# Patient Record
Sex: FEMALE | Race: WHITE | NOT HISPANIC OR LATINO | URBAN - METROPOLITAN AREA
[De-identification: names, ages, dates, MRNs, and addresses within clinical notes are randomized per-mention and may not be internally consistent; named-entity substitution may affect disease eponyms.]

---

## 2020-09-10 ENCOUNTER — INPATIENT (INPATIENT)
Facility: HOSPITAL | Age: 70
LOS: 2 days | Discharge: HOME | DRG: 309 | End: 2020-09-13
Attending: GENERAL PRACTICE | Admitting: GENERAL PRACTICE
Payer: MEDICARE

## 2020-09-10 VITALS
DIASTOLIC BLOOD PRESSURE: 76 MMHG | OXYGEN SATURATION: 95 % | HEART RATE: 103 BPM | HEIGHT: 63 IN | WEIGHT: 177.91 LBS | SYSTOLIC BLOOD PRESSURE: 131 MMHG | RESPIRATION RATE: 22 BRPM | TEMPERATURE: 98 F

## 2020-09-10 DIAGNOSIS — I48.91 UNSPECIFIED ATRIAL FIBRILLATION: ICD-10-CM

## 2020-09-10 DIAGNOSIS — F41.9 ANXIETY DISORDER, UNSPECIFIED: ICD-10-CM

## 2020-09-10 DIAGNOSIS — I34.0 NONRHEUMATIC MITRAL (VALVE) INSUFFICIENCY: ICD-10-CM

## 2020-09-10 DIAGNOSIS — I10 ESSENTIAL (PRIMARY) HYPERTENSION: ICD-10-CM

## 2020-09-10 DIAGNOSIS — I48.19 OTHER PERSISTENT ATRIAL FIBRILLATION: ICD-10-CM

## 2020-09-10 DIAGNOSIS — E03.9 HYPOTHYROIDISM, UNSPECIFIED: ICD-10-CM

## 2020-09-10 DIAGNOSIS — Z98.89 OTHER SPECIFIED POSTPROCEDURAL STATES: Chronic | ICD-10-CM

## 2020-09-10 LAB
ALBUMIN SERPL ELPH-MCNC: 4.7 G/DL — SIGNIFICANT CHANGE UP (ref 3.3–5)
ALP SERPL-CCNC: 63 U/L — SIGNIFICANT CHANGE UP (ref 40–120)
ALT FLD-CCNC: 13 U/L — SIGNIFICANT CHANGE UP (ref 10–45)
ANION GAP SERPL CALC-SCNC: 16 MMOL/L — SIGNIFICANT CHANGE UP (ref 5–17)
APTT BLD: 38.8 SEC — HIGH (ref 27.5–35.5)
AST SERPL-CCNC: 16 U/L — SIGNIFICANT CHANGE UP (ref 10–40)
BASOPHILS # BLD AUTO: 0.04 K/UL — SIGNIFICANT CHANGE UP (ref 0–0.2)
BASOPHILS NFR BLD AUTO: 0.4 % — SIGNIFICANT CHANGE UP (ref 0–2)
BILIRUB SERPL-MCNC: 0.5 MG/DL — SIGNIFICANT CHANGE UP (ref 0.2–1.2)
BUN SERPL-MCNC: 13 MG/DL — SIGNIFICANT CHANGE UP (ref 7–23)
CALCIUM SERPL-MCNC: 9.4 MG/DL — SIGNIFICANT CHANGE UP (ref 8.4–10.5)
CHLORIDE SERPL-SCNC: 97 MMOL/L — SIGNIFICANT CHANGE UP (ref 96–108)
CO2 SERPL-SCNC: 22 MMOL/L — SIGNIFICANT CHANGE UP (ref 22–31)
CREAT SERPL-MCNC: 0.6 MG/DL — SIGNIFICANT CHANGE UP (ref 0.5–1.3)
EOSINOPHIL # BLD AUTO: 0.07 K/UL — SIGNIFICANT CHANGE UP (ref 0–0.5)
EOSINOPHIL NFR BLD AUTO: 0.6 % — SIGNIFICANT CHANGE UP (ref 0–6)
GLUCOSE SERPL-MCNC: 107 MG/DL — HIGH (ref 70–99)
HCT VFR BLD CALC: 43.7 % — SIGNIFICANT CHANGE UP (ref 34.5–45)
HGB BLD-MCNC: 14.4 G/DL — SIGNIFICANT CHANGE UP (ref 11.5–15.5)
IMM GRANULOCYTES NFR BLD AUTO: 0.4 % — SIGNIFICANT CHANGE UP (ref 0–1.5)
INR BLD: 1.41 RATIO — HIGH (ref 0.88–1.16)
LYMPHOCYTES # BLD AUTO: 1.97 K/UL — SIGNIFICANT CHANGE UP (ref 1–3.3)
LYMPHOCYTES # BLD AUTO: 17.8 % — SIGNIFICANT CHANGE UP (ref 13–44)
MCHC RBC-ENTMCNC: 29.7 PG — SIGNIFICANT CHANGE UP (ref 27–34)
MCHC RBC-ENTMCNC: 33 GM/DL — SIGNIFICANT CHANGE UP (ref 32–36)
MCV RBC AUTO: 90.1 FL — SIGNIFICANT CHANGE UP (ref 80–100)
MONOCYTES # BLD AUTO: 0.95 K/UL — HIGH (ref 0–0.9)
MONOCYTES NFR BLD AUTO: 8.6 % — SIGNIFICANT CHANGE UP (ref 2–14)
NEUTROPHILS # BLD AUTO: 7.98 K/UL — HIGH (ref 1.8–7.4)
NEUTROPHILS NFR BLD AUTO: 72.2 % — SIGNIFICANT CHANGE UP (ref 43–77)
NRBC # BLD: 0 /100 WBCS — SIGNIFICANT CHANGE UP (ref 0–0)
PLATELET # BLD AUTO: 284 K/UL — SIGNIFICANT CHANGE UP (ref 150–400)
POTASSIUM SERPL-MCNC: 3.9 MMOL/L — SIGNIFICANT CHANGE UP (ref 3.5–5.3)
POTASSIUM SERPL-SCNC: 3.9 MMOL/L — SIGNIFICANT CHANGE UP (ref 3.5–5.3)
PROT SERPL-MCNC: 7.7 G/DL — SIGNIFICANT CHANGE UP (ref 6–8.3)
PROTHROM AB SERPL-ACNC: 16.5 SEC — HIGH (ref 10.6–13.6)
RBC # BLD: 4.85 M/UL — SIGNIFICANT CHANGE UP (ref 3.8–5.2)
RBC # FLD: 13.9 % — SIGNIFICANT CHANGE UP (ref 10.3–14.5)
SARS-COV-2 RNA SPEC QL NAA+PROBE: SIGNIFICANT CHANGE UP
SODIUM SERPL-SCNC: 135 MMOL/L — SIGNIFICANT CHANGE UP (ref 135–145)
TROPONIN T, HIGH SENSITIVITY RESULT: 13 NG/L — SIGNIFICANT CHANGE UP (ref 0–51)
WBC # BLD: 11.05 K/UL — HIGH (ref 3.8–10.5)
WBC # FLD AUTO: 11.05 K/UL — HIGH (ref 3.8–10.5)

## 2020-09-10 PROCEDURE — 71046 X-RAY EXAM CHEST 2 VIEWS: CPT | Mod: 26

## 2020-09-10 PROCEDURE — 99285 EMERGENCY DEPT VISIT HI MDM: CPT

## 2020-09-10 PROCEDURE — 93010 ELECTROCARDIOGRAM REPORT: CPT

## 2020-09-10 RX ORDER — LEVOTHYROXINE SODIUM 125 MCG
75 TABLET ORAL DAILY
Refills: 0 | Status: DISCONTINUED | OUTPATIENT
Start: 2020-09-10 | End: 2020-09-13

## 2020-09-10 RX ORDER — FUROSEMIDE 40 MG
20 TABLET ORAL DAILY
Refills: 0 | Status: DISCONTINUED | OUTPATIENT
Start: 2020-09-10 | End: 2020-09-13

## 2020-09-10 RX ORDER — BUDESONIDE AND FORMOTEROL FUMARATE DIHYDRATE 160; 4.5 UG/1; UG/1
2 AEROSOL RESPIRATORY (INHALATION)
Refills: 0 | Status: DISCONTINUED | OUTPATIENT
Start: 2020-09-10 | End: 2020-09-11

## 2020-09-10 RX ORDER — METOPROLOL TARTRATE 50 MG
50 TABLET ORAL
Refills: 0 | Status: DISCONTINUED | OUTPATIENT
Start: 2020-09-10 | End: 2020-09-11

## 2020-09-10 RX ORDER — ALBUTEROL 90 UG/1
2 AEROSOL, METERED ORAL EVERY 6 HOURS
Refills: 0 | Status: DISCONTINUED | OUTPATIENT
Start: 2020-09-10 | End: 2020-09-13

## 2020-09-10 RX ORDER — ALPRAZOLAM 0.25 MG
0.12 TABLET ORAL
Refills: 0 | Status: DISCONTINUED | OUTPATIENT
Start: 2020-09-10 | End: 2020-09-13

## 2020-09-10 RX ORDER — SIMVASTATIN 20 MG/1
20 TABLET, FILM COATED ORAL AT BEDTIME
Refills: 0 | Status: DISCONTINUED | OUTPATIENT
Start: 2020-09-10 | End: 2020-09-13

## 2020-09-10 RX ORDER — APIXABAN 2.5 MG/1
5 TABLET, FILM COATED ORAL EVERY 12 HOURS
Refills: 0 | Status: DISCONTINUED | OUTPATIENT
Start: 2020-09-10 | End: 2020-09-13

## 2020-09-10 RX ORDER — CHOLECALCIFEROL (VITAMIN D3) 125 MCG
2000 CAPSULE ORAL DAILY
Refills: 0 | Status: DISCONTINUED | OUTPATIENT
Start: 2020-09-10 | End: 2020-09-13

## 2020-09-10 RX ORDER — INFLUENZA VIRUS VACCINE 15; 15; 15; 15 UG/.5ML; UG/.5ML; UG/.5ML; UG/.5ML
0.5 SUSPENSION INTRAMUSCULAR ONCE
Refills: 0 | Status: DISCONTINUED | OUTPATIENT
Start: 2020-09-10 | End: 2020-09-13

## 2020-09-10 RX ORDER — MONTELUKAST 4 MG/1
10 TABLET, CHEWABLE ORAL AT BEDTIME
Refills: 0 | Status: DISCONTINUED | OUTPATIENT
Start: 2020-09-10 | End: 2020-09-13

## 2020-09-10 RX ORDER — LOSARTAN POTASSIUM 100 MG/1
25 TABLET, FILM COATED ORAL DAILY
Refills: 0 | Status: DISCONTINUED | OUTPATIENT
Start: 2020-09-10 | End: 2020-09-13

## 2020-09-10 RX ORDER — TIOTROPIUM BROMIDE 18 UG/1
1 CAPSULE ORAL; RESPIRATORY (INHALATION) DAILY
Refills: 0 | Status: DISCONTINUED | OUTPATIENT
Start: 2020-09-10 | End: 2020-09-13

## 2020-09-10 RX ADMIN — Medication 50 MILLIGRAM(S): at 23:50

## 2020-09-10 RX ADMIN — APIXABAN 5 MILLIGRAM(S): 2.5 TABLET, FILM COATED ORAL at 21:01

## 2020-09-10 RX ADMIN — SIMVASTATIN 20 MILLIGRAM(S): 20 TABLET, FILM COATED ORAL at 21:01

## 2020-09-10 NOTE — ED PROVIDER NOTE - CLINICAL SUMMARY MEDICAL DECISION MAKING FREE TEXT BOX
Attending MD Cook: 70F with ho afib on eliquis, MVR referred to ED for CORMIER and ongoing afib. Patient in afib currently though rate controlled, no diagnostic ischemic changes on ECG. Patient to be admitted for consideration of DCCV given likely symptomatic afib. Case discussed with cardiology Dr. Espinoza who requests admission to Dr. Messina for tele and consideration of DCCV

## 2020-09-10 NOTE — H&P ADULT - PROBLEM SELECTOR PLAN 1
appreciated cardiology note and discussed  plan for BIPIN + DCC tomorrow  NPO post midnight  continue beta blocker  continue AC  tele monitoring

## 2020-09-10 NOTE — ED PROVIDER NOTE - ATTENDING CONTRIBUTION TO CARE
Attending MD Cook:  I personally have seen and examined this patient.  Resident note reviewed and agree on plan of care and except where noted.  See HPI, PE, and MDM for details.

## 2020-09-10 NOTE — PATIENT PROFILE ADULT - STATED REASON FOR ADMISSION
"I went to my cardiologist after feeling SOB and found out I had afib. I was told by my PCP to come in."

## 2020-09-10 NOTE — ED PROVIDER NOTE - FAMILY HISTORY
Mother  Still living? Unknown  Family history of CHF (congestive heart failure), Age at diagnosis: Age Unknown  Family history of lung cancer, Age at diagnosis: Age Unknown

## 2020-09-10 NOTE — ED PROVIDER NOTE - PMH
Cardiac arrhythmia    HTN (hypertension)    Hypothyroid    Mitral regurgitation    Mitral valve prolapse    Osteoporosis    Syncope    Varicose vein

## 2020-09-10 NOTE — ED ADULT NURSE NOTE - OBJECTIVE STATEMENT
70 year old female presents to ED via wheel chair through waiting room from PMD complaining of shortness of breath / CORMIER. History of hypothyroid, COPD, asthma. States she had not been feeling well x 1+ month, went to PMD a month ago and was diagnosed with new onset AFib, started on metoprolol and Eliquis. States she went for follow up today and was sent in for continued sob/cormier "and he wants me to get a BIPIN". States shortness of breath is resolved at rest. Denies headache, chest pain, abdominal pain, palpitations, NVD, fevers, chills.

## 2020-09-10 NOTE — CONSULT NOTE ADULT - SUBJECTIVE AND OBJECTIVE BOX
CHIEF COMPLAINT:Patient is a 70y old  Female who presents with a chief complaint of sob and palpitation      HPI:  pt with hx of vhtn , asthma, s/p MV repair few years ago who was seen in the office with increasing sob and palpitation and was found to be in rapid a.fib, pt lopressor has increased and started on ac.  pt presented to er with increasing sob and palpitation.    PAST MEDICAL & SURGICAL HISTORY:  Osteoporosis  Hypothyroid  Syncope  Cardiac arrhythmia  Varicose vein  HTN (hypertension)  Mitral regurgitation  Mitral valve prolapse  S/P mitral valve repair  History of varicose vein stripping   delivery NOS      MEDICATIONS  (STANDING):    MEDICATIONS  (PRN):      FAMILY HISTORY:  Family history of lung cancer  Family history of CHF (congestive heart failure)      SOCIAL HISTORY:    [ ] Non-smoker  [ ] Smoker  [ ] Alcohol    Allergies    No Known Allergies    Intolerances    	    REVIEW OF SYSTEMS:  CONSTITUTIONAL: No fever, weight loss, or fatigue  EYES: No eye pain, visual disturbances, or discharge  ENT:  No difficulty hearing, tinnitus, vertigo; No sinus or throat pain  NECK: No pain or stiffness  RESPIRATORY: No cough, wheezing, chills or hemoptysis; + Shortness of Breath  CARDIOVASCULAR: No chest pain,+  palpitations, no passing out, dizziness, or leg swelling  GASTROINTESTINAL: No abdominal or epigastric pain. No nausea, vomiting, or hematemesis; No diarrhea or constipation. No melena or hematochezia.  GENITOURINARY: No dysuria, frequency, hematuria, or incontinence  NEUROLOGICAL: No headaches, memory loss, loss of strength, numbness, or tremors  SKIN: No itching, burning, rashes, or lesions   LYMPH Nodes: No enlarged glands  ENDOCRINE: No heat or cold intolerance; No hair loss  MUSCULOSKELETAL: No joint pain or swelling; No muscle, back, or extremity pain  PSYCHIATRIC: No depression, anxiety, mood swings, or difficulty sleeping  HEME/LYMPH: No easy bruising, or bleeding gums  ALLERGY AND IMMUNOLOGIC: No hives or eczema	    [ ] All others negative	  [ ] Unable to obtain    PHYSICAL EXAM:  T(C): 36.7 (09-10-20 @ 13:41), Max: 36.7 (09-10-20 @ 13:41)  HR: 103 (09-10-20 @ 13:41) (103 - 103)  BP: 131/76 (09-10-20 @ 13:41) (131/76 - 131/76)  RR: 22 (09-10-20 @ 13:41) (22 - 22)  SpO2: 95% (09-10-20 @ 13:41) (95% - 95%)  Wt(kg): --  I&O's Summary      Appearance: Normal	  HEENT:   Normal oral mucosa, PERRL, EOMI, +JVP	  Lymphatic: No lymphadenopathy  Cardiovascular: Normal S1 S2, No JVD, + murmurs, No edema  Respiratory: rhonchi  Psychiatry: A & O x 3, Mood & affect appropriate  Gastrointestinal:  Soft, Non-tender, + BS	  Skin: No rashes, No ecchymoses, No cyanosis	  Neurologic: Non-focal  Extremities: Normal range of motion, No clubbing, cyanosis or edema  Vascular: Peripheral pulses palpable 2+ bilaterally    TELEMETRY: 	    ECG:  	  RADIOLOGY:  OTHER: 	  	  LABS:	 	    CARDIAC MARKERS:                              14.4   11.05 )-----------( 284      ( 10 Sep 2020 14:33 )             43.7     09-10    135  |  97  |  13  ----------------------------<  107<H>  3.9   |  22  |  0.60    Ca    9.4      10 Sep 2020 14:33    TPro  7.7  /  Alb  4.7  /  TBili  0.5  /  DBili  x   /  AST  16  /  ALT  13  /  AlkPhos  63  09-10    proBNP:   Lipid Profile:   HgA1c:   TSH:   PT/INR - ( 10 Sep 2020 14:33 )   PT: 16.5 sec;   INR: 1.41 ratio         PTT - ( 10 Sep 2020 14:33 )  PTT:38.8 sec    PREVIOUS DIAGNOSTIC TESTING:    < from: Transthoracic Echocardiogram (14 @ 07:50) >  1. An annuloplasty ring seen in mitral position.Mean  transmitral valve gradient equals 3 mm Hg, estimatedmitral  valve area equals 2.5 sqcm (by pressure half time  equation). Minimal mitral regurgitation.  2. Normal trileaflet aortic valve.  3. Normal aortic root.  4. Mild left atrial enlargement.  5. Mild concentric left ventricular hypertrophy.  6. Normal left ventricular function.  7. Grade I diastolic dysfunction    < end of copied text >            PAST MEDICAL & SURGICAL HISTORY:  Osteoporosis  Hypothyroid  Syncope  Cardiac arrhythmia  Varicose vein  HTN (hypertension)  Mitral regurgitation  Mitral valve prolapse  S/P mitral valve repair  History of varicose vein stripping   delivery NOS      MEDICATIONS  (STANDING):    MEDICATIONS  (PRN):      FAMILY HISTORY:  Family history of lung cancer  Family history of CHF (congestive heart failure)      SOCIAL HISTORY:    [ ] Non-smoker  [ ] Smoker  [ ] Alcohol    Allergies    No Known Allergies    Intolerances    	    REVIEW OF SYSTEMS:  CONSTITUTIONAL: No fever, weight loss, or fatigue  EYES: No eye pain, visual disturbances, or discharge  ENT:  No difficulty hearing, tinnitus, vertigo; No sinus or throat pain  NECK: No pain or stiffness  RESPIRATORY: No cough, wheezing, chills or hemoptysis; No Shortness of Breath  CARDIOVASCULAR: No chest pain, palpitations, passing out, dizziness, or leg swelling  GASTROINTESTINAL: No abdominal or epigastric pain. No nausea, vomiting, or hematemesis; No diarrhea or constipation. No melena or hematochezia.  GENITOURINARY: No dysuria, frequency, hematuria, or incontinence  NEUROLOGICAL: No headaches, memory loss, loss of strength, numbness, or tremors  SKIN: No itching, burning, rashes, or lesions   LYMPH Nodes: No enlarged glands  ENDOCRINE: No heat or cold intolerance; No hair loss  MUSCULOSKELETAL: No joint pain or swelling; No muscle, back, or extremity pain  PSYCHIATRIC: No depression, anxiety, mood swings, or difficulty sleeping  HEME/LYMPH: No easy bruising, or bleeding gums  ALLERGY AND IMMUNOLOGIC: No hives or eczema	    [ ] All others negative	  [ ] Unable to obtain    PHYSICAL EXAM:  T(C): 36.7 (09-10-20 @ 13:41), Max: 36.7 (09-10-20 @ 13:41)  HR: 103 (09-10-20 @ 13:41) (103 - 103)  BP: 131/76 (09-10-20 @ 13:41) (131/76 - 131/76)  RR: 22 (09-10-20 @ 13:41) (22 - 22)  SpO2: 95% (09-10-20 @ 13:41) (95% - 95%)  Wt(kg): --  I&O's Summary      Appearance: Normal	  HEENT:   Normal oral mucosa, PERRL, EOMI	  Lymphatic: No lymphadenopathy  Cardiovascular: Normal S1 S2, No JVD,+ murmurs, No edema  Respiratory: Lungs clear to auscultation	  Psychiatry: A & O x 3, Mood & affect appropriate  Gastrointestinal:  Soft, Non-tender, + BS	  Skin: No rashes, No ecchymoses, No cyanosis	  Neurologic: Non-focal  Extremities: Normal range of motion, No clubbing, cyanosis or edema  Vascular: Peripheral pulses palpable 2+ bilaterally    TELEMETRY: 	    ECG:  	  RADIOLOGY:  OTHER: 	  	  LABS:	 	    CARDIAC MARKERS:                              14.4   11.05 )-----------( 284      ( 10 Sep 2020 14:33 )             43.7     09-10    135  |  97  |  13  ----------------------------<  107<H>  3.9   |  22  |  0.60    Ca    9.4      10 Sep 2020 14:33    TPro  7.7  /  Alb  4.7  /  TBili  0.5  /  DBili  x   /  AST  16  /  ALT  13  /  AlkPhos  63  09-10    proBNP:   Lipid Profile:   HgA1c:   TSH:   PT/INR - ( 10 Sep 2020 14:33 )   PT: 16.5 sec;   INR: 1.41 ratio         PTT - ( 10 Sep 2020 14:33 )  PTT:38.8 sec    PREVIOUS DIAGNOSTIC TESTING:

## 2020-09-10 NOTE — ED PROVIDER NOTE - PROGRESS NOTE DETAILS
Ceasr Samson MD:  Discussed case with Dr. Espinoza, states case was discussed with Dr. Lombardi and does not require contact prior to admission.

## 2020-09-10 NOTE — ED PROVIDER NOTE - OBJECTIVE STATEMENT
70 Y F H/O Afib (1month) on eliquis, rate controlled (metoprolol 50 mg am, 25 mg pm), CHF, presenting with 70 Y F H/O Afib (diagnosed 1 month ago) on eliquis, rate controlled (metoprolol 50 mg am, 25 mg pm), CHF, presenting due to request from primary care physician Dr. Espinoza for admission. SOB has been exertional, no improvement with attempts at rate control. Denies any Fever, chills, CP, Dyspnea, Nausea, Vomiting, Dysuria, AMS, vision change.

## 2020-09-10 NOTE — ED PROVIDER NOTE - PHYSICAL EXAMINATION
Physical Exam:  General: NAD, Conversive  Eyes: EOMI, Conjunctia and sclera clear  Neck: No JVD  Lungs: Clear to auscultation bilaterally, no wheeze, no rhonchi  Heart: Irregular HR  Abdomen: Soft, nontender, nondistended, no CVA tenderness  Extremities: 2+ peripheral pulses, no edema  Psych: AAO X3  Neurologic: Non-focal

## 2020-09-10 NOTE — H&P ADULT - PROBLEM SELECTOR PLAN 4
post mitral valve procedure  check BIPIN as above      concern for malfunction given recurrence of Afib  cardio appreciated

## 2020-09-10 NOTE — ED ADULT NURSE REASSESSMENT NOTE - NS ED NURSE REASSESS COMMENT FT1
Received report this PM from Dory Cox  RN. PT observed resting comfortably in bed. Pt denies any needs at this time. Awaiting admission bed . Plan of care reviewed with pt. Pt educated on call bell system and provided call bell. Bed in lowest position, wheels locked, and patient placed in position of comfort.

## 2020-09-10 NOTE — CONSULT NOTE ADULT - ASSESSMENT
pt with hx of vhtn , asthma, s/p MV repair few years ago who was seen in the office with increasing sob and palpitation and was found to be in rapid a.fib, pt lopressor has increased and started on ac.  pt presented to er with increasing sob and palpitation.  new onset of chf with new a.fib  r/o valvular dysfunction will schedule   for possible BIPIN/CARDIOVERSION in am  continue ac  increase beta blocker as tolerated  tele  chest x ray  will adjust cardiac meds

## 2020-09-10 NOTE — ED PROVIDER NOTE - NS ED ROS FT
GENERAL: No fever or chills  EYES: No change in vision  HEENT: No trouble swallowing or speaking  CARDIAC: No chest pain  PULMONARY: No cough + SOB  GI: No abdominal pain, no nausea or no vomiting, no diarrhea or constipation  : No changes in urination  SKIN: No rashes  NEURO: No headache, no numbness  MSK: No joint pain  Otherwise as HPI or negative.

## 2020-09-10 NOTE — H&P ADULT - HISTORY OF PRESENT ILLNESS
>>>>>>Medical Attending Initial / Admission note<<<<<<  -------------------------------------------------------------------------------  CHIEF COMPLAINT & HISTORY OF PRESENT ILLNESS:      Pt is a 69 y/o woman with PMH of Afib (diagnosed 1 month ago) on eliquis, rate controlled, Hypothyroidism, anxiety, HTN, post mitral valve surgery, presenting from cardiology office for admission for worsening CORMIER, likely due to Afib > for BIPIN + DCC.   Pt states about a month ago pt was noted to be more dyspneic and found to be in Afib on EKG ( only had brief Afib post op) > started on meds, including Eliquis   SOB has been exertional, no improvement with attempts at rate control.   Denies any Fever, chills, CP, Dyspnea, Nausea, Vomiting, Dysuria, AMS, vision change.  pt otherwise feels Ok, comfortable    --------------------------------------------------------------------------------  PAST MEDICAL HISTORY:    HTN (hypertension)    Hypothyroid    Mitral regurgitation    Mitral valve prolapse    Osteoporosis    Syncope    Varicose vein.  Afib    --------------------------------------------------------------------------------  PAST SURGICAL HISTORY:     delivery NOS    History of varicose vein stripping    S/P mitral valve repair.    --------------------------------------------------------------------------------  FAMILY HISTORY:    Family history of CHF   Family history of lung cancer    --------------------------------------------------------------------------------  SOCIAL HISTORY:  Alcohol: None reported  Smoking: None reported     --------------------------------------------------------------------------------  ALLERGIES:    [As bellow, reviewed]    --------------------------------------------------------------------------------  MEDICATIONS:   [As marlen, reviewed]    --------------------------------------------------------------------------------  REVIEW OF SYSTEM:    GEN: no fever, no chills, no pain  RESP: no SOB at rest , no cough, no sputum  CVS: no chest pain, no palpitations, no edema  GI: no abdominal pain, no nausea, no vomiting, no constipation, no diarrhea  : no dysuria, no frequency, no hematuria  Neuro: no headache, no dizziness  PSYCH: + anxiety at times , no depression  Derm : no itching, no rash    --------------------------------------------------------------------------------  VITAL SIGNS:     Height (cm): 160.02  Weight (kg): 80.7  BMI (kg/m2): 31.5  Vital Signs Last 24 HrsT(C): 36.7 (09-10-20 @ 13:41)  T(F): 98 (09-10-20 @ 13:41), Max: 98 (09-10-20 @ 13:41)  HR: 103 (09-10-20 @ :) (103 - 103)  BP: 131/76 (09-10-20 @ 13:41)  RR: 22 (09-10-20 @ 13:) (22 - 22)  SpO2: 95% (09-10-20 @ 13:) (95% - 95%)      --------------------------------------------------------------------------------  PHYSICAL EXAM:    GEN: A&O X 3 , NAD , comfortable, pleasant in chair   HEENT: NCAT, PERRL, MMM, hearing intact  Neck: supple , no JVD  CVS: S1S2 , Irregular , No M/R/G appreciated  PULM: CTA B/L,  no W/R/R appreciated  ABD.: soft. non tender, non distended,  bowel sounds present  Extrem: intact pulses , no edema   Derm: No rash , no ecchymoses  PSYCH : normal mood,  no delusion not anxious    --------------------------------------------------------------------------------  LAB AND IMAGING:   [As marlen, reviewed]    --------------------------------------------------------------------------------  ASSESSMENT AND PLAN:   [As marlen]    --------------------  Case discussed with pt, cardio   ___________________________  H. RICK Messina.  Pager: [phone]

## 2020-09-10 NOTE — ED ADULT TRIAGE NOTE - BP NONINVASIVE DIASTOLIC (MM HG)
detailed exam detailed exam detailed exam detailed exam detailed exam detailed exam detailed exam detailed exam detailed exam detailed exam detailed exam detailed exam detailed exam detailed exam detailed exam detailed exam detailed exam detailed exam Breath Sounds equal & clear to percussion & auscultation, no accessory muscle use Breath Sounds equal & clear to percussion & auscultation, no accessory muscle use Breath Sounds equal & clear to percussion & auscultation, no accessory muscle use detailed exam detailed exam Breath Sounds equal & clear to percussion & auscultation, no accessory muscle use Breath Sounds equal & clear to percussion & auscultation, no accessory muscle use Breath Sounds equal & clear to percussion & auscultation, no accessory muscle use Breath Sounds equal & clear to percussion & auscultation, no accessory muscle use Breath Sounds equal & clear to percussion & auscultation, no accessory muscle use Breath Sounds equal & clear to percussion & auscultation, no accessory muscle use Breath Sounds equal & clear to percussion & auscultation, no accessory muscle use Breath Sounds equal & clear to percussion & auscultation, no accessory muscle use Breath Sounds equal & clear to percussion & auscultation, no accessory muscle use Breath Sounds equal & clear to percussion & auscultation, no accessory muscle use Breath Sounds equal & clear to percussion & auscultation, no accessory muscle use Breath Sounds equal & clear to percussion & auscultation, no accessory muscle use Breath Sounds equal & clear to percussion & auscultation, no accessory muscle use 76

## 2020-09-11 ENCOUNTER — TRANSCRIPTION ENCOUNTER (OUTPATIENT)
Age: 70
End: 2020-09-11

## 2020-09-11 LAB
ANION GAP SERPL CALC-SCNC: 11 MMOL/L — SIGNIFICANT CHANGE UP (ref 5–17)
BUN SERPL-MCNC: 18 MG/DL — SIGNIFICANT CHANGE UP (ref 7–23)
CALCIUM SERPL-MCNC: 8.8 MG/DL — SIGNIFICANT CHANGE UP (ref 8.4–10.5)
CHLORIDE SERPL-SCNC: 100 MMOL/L — SIGNIFICANT CHANGE UP (ref 96–108)
CO2 SERPL-SCNC: 24 MMOL/L — SIGNIFICANT CHANGE UP (ref 22–31)
CREAT SERPL-MCNC: 0.67 MG/DL — SIGNIFICANT CHANGE UP (ref 0.5–1.3)
GLUCOSE SERPL-MCNC: 94 MG/DL — SIGNIFICANT CHANGE UP (ref 70–99)
HCT VFR BLD CALC: 39.8 % — SIGNIFICANT CHANGE UP (ref 34.5–45)
HGB BLD-MCNC: 12.9 G/DL — SIGNIFICANT CHANGE UP (ref 11.5–15.5)
MAGNESIUM SERPL-MCNC: 2 MG/DL — SIGNIFICANT CHANGE UP (ref 1.6–2.6)
MCHC RBC-ENTMCNC: 29.3 PG — SIGNIFICANT CHANGE UP (ref 27–34)
MCHC RBC-ENTMCNC: 32.4 GM/DL — SIGNIFICANT CHANGE UP (ref 32–36)
MCV RBC AUTO: 90.2 FL — SIGNIFICANT CHANGE UP (ref 80–100)
NRBC # BLD: 0 /100 WBCS — SIGNIFICANT CHANGE UP (ref 0–0)
PHOSPHATE SERPL-MCNC: 3.3 MG/DL — SIGNIFICANT CHANGE UP (ref 2.5–4.5)
PLATELET # BLD AUTO: 234 K/UL — SIGNIFICANT CHANGE UP (ref 150–400)
POTASSIUM SERPL-MCNC: 3.6 MMOL/L — SIGNIFICANT CHANGE UP (ref 3.5–5.3)
POTASSIUM SERPL-SCNC: 3.6 MMOL/L — SIGNIFICANT CHANGE UP (ref 3.5–5.3)
RBC # BLD: 4.41 M/UL — SIGNIFICANT CHANGE UP (ref 3.8–5.2)
RBC # FLD: 14.1 % — SIGNIFICANT CHANGE UP (ref 10.3–14.5)
SODIUM SERPL-SCNC: 135 MMOL/L — SIGNIFICANT CHANGE UP (ref 135–145)
WBC # BLD: 10.18 K/UL — SIGNIFICANT CHANGE UP (ref 3.8–10.5)
WBC # FLD AUTO: 10.18 K/UL — SIGNIFICANT CHANGE UP (ref 3.8–10.5)

## 2020-09-11 PROCEDURE — 93010 ELECTROCARDIOGRAM REPORT: CPT

## 2020-09-11 PROCEDURE — 93312 ECHO TRANSESOPHAGEAL: CPT | Mod: 26

## 2020-09-11 PROCEDURE — 93010 ELECTROCARDIOGRAM REPORT: CPT | Mod: 77

## 2020-09-11 PROCEDURE — 93306 TTE W/DOPPLER COMPLETE: CPT | Mod: 26

## 2020-09-11 RX ORDER — METOPROLOL TARTRATE 50 MG
25 TABLET ORAL
Refills: 0 | Status: DISCONTINUED | OUTPATIENT
Start: 2020-09-11 | End: 2020-09-12

## 2020-09-11 RX ORDER — METOPROLOL TARTRATE 50 MG
1 TABLET ORAL
Qty: 0 | Refills: 0 | DISCHARGE

## 2020-09-11 RX ORDER — METOPROLOL TARTRATE 50 MG
1 TABLET ORAL
Qty: 60 | Refills: 0
Start: 2020-09-11 | End: 2020-10-10

## 2020-09-11 RX ORDER — POTASSIUM CHLORIDE 20 MEQ
40 PACKET (EA) ORAL ONCE
Refills: 0 | Status: COMPLETED | OUTPATIENT
Start: 2020-09-11 | End: 2020-09-11

## 2020-09-11 RX ORDER — FLUTICASONE FUROATE AND VILANTEROL TRIFENATATE 100; 25 UG/1; UG/1
1 POWDER RESPIRATORY (INHALATION) DAILY
Refills: 0 | Status: DISCONTINUED | OUTPATIENT
Start: 2020-09-11 | End: 2020-09-13

## 2020-09-11 RX ADMIN — Medication 40 MILLIEQUIVALENT(S): at 17:43

## 2020-09-11 RX ADMIN — Medication 20 MILLIGRAM(S): at 06:06

## 2020-09-11 RX ADMIN — LOSARTAN POTASSIUM 25 MILLIGRAM(S): 100 TABLET, FILM COATED ORAL at 06:05

## 2020-09-11 RX ADMIN — SIMVASTATIN 20 MILLIGRAM(S): 20 TABLET, FILM COATED ORAL at 20:04

## 2020-09-11 RX ADMIN — Medication 75 MICROGRAM(S): at 06:06

## 2020-09-11 RX ADMIN — Medication 50 MILLIGRAM(S): at 08:01

## 2020-09-11 RX ADMIN — APIXABAN 5 MILLIGRAM(S): 2.5 TABLET, FILM COATED ORAL at 08:02

## 2020-09-11 RX ADMIN — Medication 2000 UNIT(S): at 11:56

## 2020-09-11 RX ADMIN — APIXABAN 5 MILLIGRAM(S): 2.5 TABLET, FILM COATED ORAL at 17:14

## 2020-09-11 RX ADMIN — MONTELUKAST 10 MILLIGRAM(S): 4 TABLET, CHEWABLE ORAL at 20:04

## 2020-09-11 RX ADMIN — MONTELUKAST 10 MILLIGRAM(S): 4 TABLET, CHEWABLE ORAL at 00:43

## 2020-09-11 RX ADMIN — Medication 25 MILLIGRAM(S): at 17:15

## 2020-09-11 NOTE — PRE-ANESTHESIA EVALUATION ADULT - NSANTHOSAYNRD_GEN_A_CORE
No. DONI screening performed.  STOP BANG Legend: 0-2 = LOW Risk; 3-4 = INTERMEDIATE Risk; 5-8 = HIGH Risk

## 2020-09-11 NOTE — DISCHARGE NOTE PROVIDER - NSDCCAREPROVSEEN_GEN_ALL_CORE_FT
Thang Messina Faraidoon D Thang Crandall  [facility] Provider Backload  Ordering Physician  Kasandra Espinoza

## 2020-09-11 NOTE — PROGRESS NOTE ADULT - SUBJECTIVE AND OBJECTIVE BOX
CARDIOLOGY     PROGRESS  NOTE   ________________________________________________    CHIEF COMPLAINT:Patient is a 70y old  Female who presents with a chief complaint of chf/rapid a.fib (10 Sep 2020 17:57)    	  REVIEW OF SYSTEMS:  CONSTITUTIONAL: No fever, weight loss, or fatigue  EYES: No eye pain, visual disturbances, or discharge  ENT:  No difficulty hearing, tinnitus, vertigo; No sinus or throat pain  NECK: No pain or stiffness  RESPIRATORY: No cough, wheezing, chills or hemoptysis; No Shortness of Breath  CARDIOVASCULAR: No chest pain, palpitations, passing out, dizziness, or leg swelling  GASTROINTESTINAL: No abdominal or epigastric pain. No nausea, vomiting, or hematemesis; No diarrhea or constipation. No melena or hematochezia.  GENITOURINARY: No dysuria, frequency, hematuria, or incontinence  NEUROLOGICAL: No headaches, memory loss, loss of strength, numbness, or tremors  SKIN: No itching, burning, rashes, or lesions   LYMPH Nodes: No enlarged glands  ENDOCRINE: No heat or cold intolerance; No hair loss  MUSCULOSKELETAL: No joint pain or swelling; No muscle, back, or extremity pain  PSYCHIATRIC: No depression, anxiety, mood swings, or difficulty sleeping  HEME/LYMPH: No easy bruising, or bleeding gums  ALLERGY AND IMMUNOLOGIC: No hives or eczema	    [ ] All others negative	  [ ] Unable to obtain    PHYSICAL EXAM:  T(C): 36.3 (09-11-20 @ 08:10), Max: 36.7 (09-10-20 @ 13:41)  HR: 82 (09-11-20 @ 08:00) (65 - 103)  BP: 106/68 (09-11-20 @ 08:10) (106/68 - 137/85)  RR: 18 (09-11-20 @ 08:00) (14 - 22)  SpO2: 94% (09-11-20 @ 08:10) (93% - 95%)  Wt(kg): --  I&O's Summary      Appearance: Normal	  HEENT:   Normal oral mucosa, PERRL, EOMI	  Lymphatic: No lymphadenopathy  Cardiovascular: Normal S1 S2, No JVD, No murmurs, No edema  Respiratory: Lungs clear to auscultation	  Psychiatry: A & O x 3, Mood & affect appropriate  Gastrointestinal:  Soft, Non-tender, + BS	  Skin: No rashes, No ecchymoses, No cyanosis	  Neurologic: Non-focal  Extremities: Normal range of motion, No clubbing, cyanosis or edema  Vascular: Peripheral pulses palpable 2+ bilaterally    MEDICATIONS  (STANDING):  apixaban 5 milliGRAM(s) Oral every 12 hours  budesonide  80 MICROgram(s)/formoterol 4.5 MICROgram(s) Inhaler 2 Puff(s) Inhalation two times a day  cholecalciferol 2000 Unit(s) Oral daily  furosemide    Tablet 20 milliGRAM(s) Oral daily  influenza   Vaccine 0.5 milliLiter(s) IntraMuscular once  levothyroxine 75 MICROGram(s) Oral daily  losartan 25 milliGRAM(s) Oral daily  metoprolol tartrate 50 milliGRAM(s) Oral two times a day  montelukast 10 milliGRAM(s) Oral at bedtime  simvastatin 20 milliGRAM(s) Oral at bedtime  tiotropium 18 MICROgram(s) Capsule 1 Capsule(s) Inhalation daily      TELEMETRY: a.fib 60 to 90    ECG:  	  RADIOLOGY:  OTHER: 	  	  LABS:	 	    CARDIAC MARKERS:                                14.4   11.05 )-----------( 284      ( 10 Sep 2020 14:33 )             43.7     09-10    135  |  97  |  13  ----------------------------<  107<H>  3.9   |  22  |  0.60    Ca    9.4      10 Sep 2020 14:33    TPro  7.7  /  Alb  4.7  /  TBili  0.5  /  DBili  x   /  AST  16  /  ALT  13  /  AlkPhos  63  09-10    proBNP:   Lipid Profile:   HgA1c:   TSH:   PT/INR - ( 10 Sep 2020 14:33 )   PT: 16.5 sec;   INR: 1.41 ratio         PTT - ( 10 Sep 2020 14:33 )  PTT:38.8 sec  < from: Xray Chest 2 Views PA/Lat (09.10.20 @ 14:55) >    INTERPRETATION:  PA and lateral chest x-ray at 1449 hours on 9/10/2020.    Clinical information: New onset atrial fibrillation, shortness of breath.    Comparison: Chest x-ray dated 8/21/2014.    Findings: Patient is status post mitral annuloplasty.    The heart is enlarged. Pulmonary vascularity appears normal. No consolidation or pleural effusion is present.    Impression: Clear lungs.      < end of copied text >      Assessment and plan  ---------------------------  pt with hx of vhtn , asthma, s/p MV repair few years ago who was seen in the office with increasing sob and palpitation and was found to be in rapid a.fib, pt lopressor has increased and started on ac.  pt presented to er with increasing sob and palpitation.  new onset of chf systolic  with new a.fib  r/o valvular dysfunction will schedule   for possible BIPIN/CARDIOVERSION in am  continue ac  increase beta blocker as tolerated  tele noted  chest x ray noted  will adjust cardiac meds

## 2020-09-11 NOTE — DISCHARGE NOTE PROVIDER - PROVIDER TOKENS
PROVIDER:[TOKEN:[[id number]]],PROVIDER:[TOKEN:[[id number]]] PROVIDER:[TOKEN:[[id number]],FOLLOWUP:[1 week]],PROVIDER:[TOKEN:[[id number]],FOLLOWUP:[2 weeks]]

## 2020-09-11 NOTE — DISCHARGE NOTE NURSING/CASE MANAGEMENT/SOCIAL WORK - PATIENT PORTAL LINK FT
You can access the FollowMyHealth Patient Portal offered by [facility] by registering at the following website: [email or web address]. By joining [employer]’s FollowMyHealth portal, you will also be able to view your health information using other applications (apps) compatible with our system.
history and physical performed by intake physician - results reviewed and case discussed with attending

## 2020-09-11 NOTE — DISCHARGE NOTE PROVIDER - NSDCFUADDINST_GEN_ALL_CORE_FT
Monitor your blood pressure at home   Monitor your blood pressure at home  Follow up with cardiology and your PCP within  1 week of discharge.

## 2020-09-11 NOTE — CHART NOTE - NSCHARTNOTEFT_GEN_A_CORE
[facility] Medicine Event Note    Notified by RN: 9 beats WCT on tele.  Pt asymptomatic, NAD. AM labs were not drawn yet today.   Stat BMP with Mg and P ordered. K 3.6, supplemented.  Pt on Lopressor 25 BID.  Above discussed with Dr. Espinoza. Confirmed to continue lopressor 25 BID  and continue to monitor patient.    Will endorse to night team.    Dory Smith PA-C  Dept of Medicine  #[id number]

## 2020-09-11 NOTE — DISCHARGE NOTE PROVIDER - CARE PROVIDER_API CALL
Kasandra Espinoza  CARDIOVASCULAR DISEASE  [address] [facility], [address]  [place] [address]  Phone: [phone]  Fax: [phone]  Follow Up Time:     Thang Messina  INTERNAL MEDICINE  [address] [facility], [facility] [address]  [place] [address]  Phone: [phone]  Fax: [phone]  Follow Up Time: Kasandra Espinoza  CARDIOVASCULAR DISEASE  [address] [facility], [address]  [place] [address]  Phone: [phone]  Fax: [phone]  Follow Up Time: 1 week    Thang Messina  INTERNAL MEDICINE  [address] [facility], [facility] [address]  [place] [address]  Phone: [phone]  Fax: [phone]  Follow Up Time: 2 weeks

## 2020-09-11 NOTE — DISCHARGE NOTE PROVIDER - NSDCMRMEDTOKEN_GEN_ALL_CORE_FT
ALPRAZolam 0.25 mg oral tablet: 0.5 tab(s) orally , As Needed - used sparingly  Artificial Tears ophthalmic solution: 1 drop(s) in each eye , As Needed  Breo Ellipta 100 mcg-25 mcg/inh inhalation powder: 1 puff(s) inhaled once a day  DuoNeb 0.5 mg-2.5 mg/3 mL inhalation solution: 3 milliliter(s) inhaled 2 times a day  Eliquis 5 mg oral tablet: 1 tab(s) orally 2 times a day  furosemide 20 mg oral tablet: 1 tab(s) orally once a day  levothyroxine 75 mcg (0.075 mg) oral tablet: 1 tab(s) orally once a day  losartan 25 mg oral tablet: 1 tab(s) orally once a day  Metamucil 3.4 g/5.2 g oral powder for reconstitution: 1 packet(s) orally once a day  metoprolol succinate 25 mg oral tablet, extended release: 1 tab(s) orally once a day (in the evening)  metoprolol succinate 50 mg oral tablet, extended release: 1 tab(s) orally once a day (in the morning)  montelukast 10 mg oral tablet: 1 tab(s) orally once a day (at bedtime)  potassium chloride 10 mEq oral tablet, extended release: 1 tab(s) orally once a day  simvastatin 20 mg oral tablet: 1 tab(s) orally once a day (at bedtime)  Vitamin C 500 mg oral tablet: 1 tab(s) orally once a day  Vitamin D3 2000 intl units (50 mcg) oral tablet: 1 tab(s) orally once a day ALPRAZolam 0.25 mg oral tablet: 0.5 tab(s) orally , As Needed - used sparingly  Artificial Tears ophthalmic solution: 1 drop(s) in each eye , As Needed  Breo Ellipta 100 mcg-25 mcg/inh inhalation powder: 1 puff(s) inhaled once a day  DuoNeb 0.5 mg-2.5 mg/3 mL inhalation solution: 3 milliliter(s) inhaled 2 times a day  Eliquis 5 mg oral tablet: 1 tab(s) orally 2 times a day  furosemide 20 mg oral tablet: 1 tab(s) orally once a day  levothyroxine 75 mcg (0.075 mg) oral tablet: 1 tab(s) orally once a day  losartan 25 mg oral tablet: 1 tab(s) orally once a day  Metamucil 3.4 g/5.2 g oral powder for reconstitution: 1 packet(s) orally once a day  metoprolol tartrate 25 mg oral tablet: 1 tab(s) orally 2 times a day  montelukast 10 mg oral tablet: 1 tab(s) orally once a day (at bedtime)  potassium chloride 10 mEq oral tablet, extended release: 1 tab(s) orally once a day  simvastatin 20 mg oral tablet: 1 tab(s) orally once a day (at bedtime)  Vitamin C 500 mg oral tablet: 1 tab(s) orally once a day  Vitamin D3 2000 intl units (50 mcg) oral tablet: 1 tab(s) orally once a day ALPRAZolam 0.25 mg oral tablet: 0.5 tab(s) orally , As Needed - used sparingly  Artificial Tears ophthalmic solution: 1 drop(s) in each eye , As Needed  Breo Ellipta 100 mcg-25 mcg/inh inhalation powder: 1 puff(s) inhaled once a day  DuoNeb 0.5 mg-2.5 mg/3 mL inhalation solution: 3 milliliter(s) inhaled 2 times a day  Eliquis 5 mg oral tablet: 1 tab(s) orally 2 times a day  furosemide 20 mg oral tablet: 1 tab(s) orally once a day  levothyroxine 75 mcg (0.075 mg) oral tablet: 1 tab(s) orally once a day  losartan 25 mg oral tablet: 1 tab(s) orally once a day  Metamucil 3.4 g/5.2 g oral powder for reconstitution: 1 packet(s) orally once a day  montelukast 10 mg oral tablet: 1 tab(s) orally once a day (at bedtime)  potassium chloride 10 mEq oral tablet, extended release: 1 tab(s) orally once a day  simvastatin 20 mg oral tablet: 1 tab(s) orally once a day (at bedtime)  Vitamin C 500 mg oral tablet: 1 tab(s) orally once a day  Vitamin D3 2000 intl units (50 mcg) oral tablet: 1 tab(s) orally once a day   ALPRAZolam 0.25 mg oral tablet: 0.5 tab(s) orally , As Needed - used sparingly  Artificial Tears ophthalmic solution: 1 drop(s) in each eye , As Needed  Breo Ellipta 100 mcg-25 mcg/inh inhalation powder: 1 puff(s) inhaled once a day  DuoNeb 0.5 mg-2.5 mg/3 mL inhalation solution: 3 milliliter(s) inhaled 2 times a day  Eliquis 5 mg oral tablet: 1 tab(s) orally 2 times a day  furosemide 20 mg oral tablet: 1 tab(s) orally once a day  levothyroxine 75 mcg (0.075 mg) oral tablet: 1 tab(s) orally once a day  losartan 25 mg oral tablet: 1 tab(s) orally once a day  Metamucil 3.4 g/5.2 g oral powder for reconstitution: 1 packet(s) orally once a day  metoprolol tartrate 25 mg oral tablet: 1 tab(s) orally 3 times a day  montelukast 10 mg oral tablet: 1 tab(s) orally once a day (at bedtime)  potassium chloride 10 mEq oral tablet, extended release: 1 tab(s) orally once a day  simvastatin 20 mg oral tablet: 1 tab(s) orally once a day (at bedtime)  Vitamin C 500 mg oral tablet: 1 tab(s) orally once a day  Vitamin D3 2000 intl units (50 mcg) oral tablet: 1 tab(s) orally once a day   ALPRAZolam 0.25 mg oral tablet: 0.5 tab(s) orally , As Needed - used sparingly  Artificial Tears ophthalmic solution: 1 drop(s) in each eye , As Needed  Breo Ellipta 100 mcg-25 mcg/inh inhalation powder: 1 puff(s) inhaled once a day  DuoNeb 0.5 mg-2.5 mg/3 mL inhalation solution: 3 milliliter(s) inhaled 2 times a day  Eliquis 5 mg oral tablet: 1 tab(s) orally 2 times a day  furosemide 20 mg oral tablet: 1 tab(s) orally once a day  levothyroxine 75 mcg (0.075 mg) oral tablet: 1 tab(s) orally once a day  losartan 25 mg oral tablet: 1 tab(s) orally once a day  Metamucil 3.4 g/5.2 g oral powder for reconstitution: 1 packet(s) orally once a day  metoprolol tartrate 25 mg oral tablet: 1 tab(s) orally 3 times a day  hold if systolic blood pressure is less than 90 and  heart rate is less than 55  montelukast 10 mg oral tablet: 1 tab(s) orally once a day (at bedtime)  potassium chloride 10 mEq oral tablet, extended release: 1 tab(s) orally once a day  simvastatin 20 mg oral tablet: 1 tab(s) orally once a day (at bedtime)  Vitamin C 500 mg oral tablet: 1 tab(s) orally once a day  Vitamin D3 2000 intl units (50 mcg) oral tablet: 1 tab(s) orally once a day

## 2020-09-11 NOTE — DISCHARGE NOTE PROVIDER - NSDCCPTREATMENT_GEN_ALL_CORE_FT
PRINCIPAL PROCEDURE  Procedure: Cardioversion, with BIPIN if indicated  Findings and Treatment: continue all medication as ordered  follow up with Dr. Arciniega in 1 week  If you develop worsening or acute shortness of breath contact Dr. arciniega and return to the ED

## 2020-09-11 NOTE — DISCHARGE NOTE PROVIDER - NSDCCPCAREPLAN_GEN_ALL_CORE_FT
PRINCIPAL DISCHARGE DIAGNOSIS  Diagnosis: Rapid atrial fibrillation  Assessment and Plan of Treatment: Atrial fibrillation is the most common heart rhythm problem & has the risk of stroke & heart attack  It helps if you control your blood pressure, not drink more than 1-2 alcohol drinks per day, cut down on caffeine, getting treatment for over active thyroid gland, & getting exercise  Call your doctor if you feel your heart racing or beating unusually, chest tightness or pain, lightheaded, faint, shortness of breath especially with exercise  It is important to take your heart medication as prescribed  You may be on anticoagulation which is very important to take as directed - you may need blood work to monitor drug levels        SECONDARY DISCHARGE DIAGNOSES  Diagnosis: Acquired hypothyroidism  Assessment and Plan of Treatment: you do not make enough thyroid hormone  signs & symptoms of low levels of thyroid hormone - tired, getting cold easily, coarse or thin hair, constipation, shortness of breath, swelling, irregular periods  your doctor will do thyroid hormone blood tests at least once a year to monitor if medication dose is adequate  take your thyroid medicine as directed by your doctor & on empty stomach      Diagnosis: Mitral valve insufficiency  Assessment and Plan of Treatment:     Diagnosis: HTN (hypertension)  Assessment and Plan of Treatment: Low salt diet  Activity as tolerated.  Take all medication as prescribed.  Follow up with your medical doctor for routine blood pressure monitoring at your next visit.  Notify your doctor if you have any of the following symptoms:   Dizziness, Lightheadedness, Blurry vision, Headache, Chest pain, Shortness of breath      Diagnosis: Dyspnea on exertion  Assessment and Plan of Treatment: PRINCIPAL DISCHARGE DIAGNOSIS  Diagnosis: Rapid atrial fibrillation  Assessment and Plan of Treatment: Continue eliquis.  You had a cardioversion on 9/11/20.   Continue metoprolol 25 mg three times a day.   Atrial fibrillation is the most common heart rhythm problem & has the risk of stroke & heart attack  It helps if you control your blood pressure, not drink more than 1-2 alcohol drinks per day, cut down on caffeine, getting treatment for over active thyroid gland, & getting exercise  Call your doctor if you feel your heart racing or beating unusually, chest tightness or pain, lightheaded, faint, shortness of breath especially with exercise  It is important to take your heart medication as prescribed  You may be on anticoagulation which is very important to take as directed - you may need blood work to monitor drug levels        SECONDARY DISCHARGE DIAGNOSES  Diagnosis: Acquired hypothyroidism  Assessment and Plan of Treatment: you do not make enough thyroid hormone  signs & symptoms of low levels of thyroid hormone - tired, getting cold easily, coarse or thin hair, constipation, shortness of breath, swelling, irregular periods  your doctor will do thyroid hormone blood tests at least once a year to monitor if medication dose is adequate  take your thyroid medicine as directed by your doctor & on empty stomach      Diagnosis: Mitral valve insufficiency  Assessment and Plan of Treatment: Follow up with Dr. Espinoza as an outpatient.    Diagnosis: HTN (hypertension)  Assessment and Plan of Treatment: Low salt diet  Activity as tolerated.  Take all medication as prescribed.  Follow up with your medical doctor for routine blood pressure monitoring at your next visit.  Notify your doctor if you have any of the following symptoms:   Dizziness, Lightheadedness, Blurry vision, Headache, Chest pain, Shortness of breath      Diagnosis: Dyspnea on exertion  Assessment and Plan of Treatment:      PRINCIPAL DISCHARGE DIAGNOSIS  Diagnosis: Rapid atrial fibrillation  Assessment and Plan of Treatment: Continue eliquis.  You had a cardioversion on 9/11/20.   Continue metoprolol 25 mg three times a day.   Atrial fibrillation is the most common heart rhythm problem & has the risk of stroke & heart attack  It helps if you control your blood pressure, not drink more than 1-2 alcohol drinks per day, cut down on caffeine, getting treatment for over active thyroid gland, & getting exercise  Call your doctor if you feel your heart racing or beating unusually, chest tightness or pain, lightheaded, faint, shortness of breath especially with exercise  It is important to take your heart medication as prescribed  You may be on anticoagulation which is very important to take as directed - you may need blood work to monitor drug levels        SECONDARY DISCHARGE DIAGNOSES  Diagnosis: Acquired hypothyroidism  Assessment and Plan of Treatment: you do not make enough thyroid hormone  signs & symptoms of low levels of thyroid hormone - tired, getting cold easily, coarse or thin hair, constipation, shortness of breath, swelling, irregular periods  your doctor will do thyroid hormone blood tests at least once a year to monitor if medication dose is adequate  take your thyroid medicine as directed by your doctor & on empty stomach      Diagnosis: Mitral valve insufficiency  Assessment and Plan of Treatment: Follow up with Dr. Espinoza as an outpatient.    Diagnosis: HTN (hypertension)  Assessment and Plan of Treatment: Monitor your blood pressure at home.  Low salt diet  Activity as tolerated.  Take all medication as prescribed.  Follow up with your medical doctor for routine blood pressure monitoring at your next visit.  Notify your doctor if you have any of the following symptoms:   Dizziness, Lightheadedness, Blurry vision, Headache, Chest pain, Shortness of breath      Diagnosis: Dyspnea on exertion  Assessment and Plan of Treatment:

## 2020-09-11 NOTE — CHART NOTE - NSCHARTNOTEFT_GEN_A_CORE
consent obtained, cardioversion 200 j sync, biphasic to nsr  steve no clots mod mr, ef 30 to 35%  will adjust meds

## 2020-09-11 NOTE — DISCHARGE NOTE PROVIDER - HOSPITAL COURSE
71 y/o female with PMHx of Afib (diagnosed 1 month ago) on Eliquis,  Hypothyroidism, anxiety, HTN, post mitral valve surgery, presenting from cardiology office for admission for worsening CORMIER, likely due to Afib, and planned for BIPIN + DCC.     S/p BIPIN with CDD on 9/11. Continue eliquis and BB (metoprolol decreased to 25 BID).    For acquired hypothyroidism, continue synthroid. For HTN, continue losartan and metoprolol    For Mitral valve insufficiency, follow up with cardiology.    For anxiety, continue xanax. 69 y/o female with PMHx of Afib (diagnosed 1 month ago) on Eliquis,  Hypothyroidism, anxiety, HTN, post mitral valve surgery, presenting from cardiology office for admission for worsening CORMIER,     likely due to Afib, for  planned STEVE + DCC.     9/11 cardioversion 200 j sync, biphasic to nsr,  steve no clots mod mr, ef 30 to 35%, adjust meds.    Continue eliquis and BB (metoprolol decreased to 25 BID).    For acquired hypothyroidism, continue synthroid. For HTN, continue losartan and metoprolol    For Mitral valve insufficiency, follow up with cardiology.    For anxiety, continue xanax.        Watch patient on tele overnight.  D/C home 9/12 if no events.  Follow up with Dr. Espinoza in one week. 71 y/o female with PMHx of Afib (diagnosed 1 month ago) on Eliquis,  Hypothyroidism, anxiety, HTN, post mitral valve surgery, presenting from cardiology office for admission for worsening CORMIER,   likely due to Afib, for  planned STEVE + DCC.   9/11 cardioversion 200 j sync, biphasic to nsr,  steve no clots mod mr, ef 30 to 35%, adjust meds.  Continue Eliquis and BB. Lopressor increased to 25 TID for episodes of WCT on tele.  For acquired hypothyroidism, continue synthroid. For HTN, continue losartan and metoprolol  For Mitral valve insufficiency, follow up with cardiology.  For anxiety, continue xanax.       71 y/o female with PMHx of Afib (diagnosed 1 month ago) on Eliquis,  Hypothyroidism, anxiety, HTN, post mitral valve surgery, presenting from cardiology office for admission for worsening CORMIER,   likely due to Afib, for  planned STEVE + DCC.   9/11 cardioversion 200 j sync, biphasic to nsr,  steve no clots mod mr, ef 30 to 35%, adjust meds.  Continue Eliquis and BB. Lopressor increased to 25 TID for episodes of WCT on tele.  For acquired hypothyroidism, continue synthroid. For HTN, continue losartan and metoprolol  For Mitral valve insufficiency, follow up with cardiology.  For anxiety, continue xanax.    Cleared for discharge on 9/13/20

## 2020-09-12 LAB
ALBUMIN SERPL ELPH-MCNC: 3.9 G/DL — SIGNIFICANT CHANGE UP (ref 3.3–5)
ALP SERPL-CCNC: 51 U/L — SIGNIFICANT CHANGE UP (ref 40–120)
ALT FLD-CCNC: 9 U/L — LOW (ref 10–45)
ANION GAP SERPL CALC-SCNC: 10 MMOL/L — SIGNIFICANT CHANGE UP (ref 5–17)
AST SERPL-CCNC: 16 U/L — SIGNIFICANT CHANGE UP (ref 10–40)
BASOPHILS # BLD AUTO: 0.04 K/UL — SIGNIFICANT CHANGE UP (ref 0–0.2)
BASOPHILS NFR BLD AUTO: 0.5 % — SIGNIFICANT CHANGE UP (ref 0–2)
BILIRUB SERPL-MCNC: 0.4 MG/DL — SIGNIFICANT CHANGE UP (ref 0.2–1.2)
BUN SERPL-MCNC: 22 MG/DL — SIGNIFICANT CHANGE UP (ref 7–23)
CALCIUM SERPL-MCNC: 8.9 MG/DL — SIGNIFICANT CHANGE UP (ref 8.4–10.5)
CHLORIDE SERPL-SCNC: 103 MMOL/L — SIGNIFICANT CHANGE UP (ref 96–108)
CHOLEST SERPL-MCNC: 115 MG/DL — SIGNIFICANT CHANGE UP (ref 10–199)
CO2 SERPL-SCNC: 24 MMOL/L — SIGNIFICANT CHANGE UP (ref 22–31)
CREAT SERPL-MCNC: 0.54 MG/DL — SIGNIFICANT CHANGE UP (ref 0.5–1.3)
EOSINOPHIL # BLD AUTO: 0.2 K/UL — SIGNIFICANT CHANGE UP (ref 0–0.5)
EOSINOPHIL NFR BLD AUTO: 2.5 % — SIGNIFICANT CHANGE UP (ref 0–6)
GLUCOSE SERPL-MCNC: 95 MG/DL — SIGNIFICANT CHANGE UP (ref 70–99)
HCT VFR BLD CALC: 37.9 % — SIGNIFICANT CHANGE UP (ref 34.5–45)
HCV AB S/CO SERPL IA: 0.26 S/CO — SIGNIFICANT CHANGE UP (ref 0–0.99)
HCV AB SERPL-IMP: SIGNIFICANT CHANGE UP
HDLC SERPL-MCNC: 38 MG/DL — LOW
HGB BLD-MCNC: 12.3 G/DL — SIGNIFICANT CHANGE UP (ref 11.5–15.5)
IMM GRANULOCYTES NFR BLD AUTO: 0.5 % — SIGNIFICANT CHANGE UP (ref 0–1.5)
LIPID PNL WITH DIRECT LDL SERPL: 56 MG/DL — SIGNIFICANT CHANGE UP
LYMPHOCYTES # BLD AUTO: 2.11 K/UL — SIGNIFICANT CHANGE UP (ref 1–3.3)
LYMPHOCYTES # BLD AUTO: 26.6 % — SIGNIFICANT CHANGE UP (ref 13–44)
MAGNESIUM SERPL-MCNC: 2.2 MG/DL — SIGNIFICANT CHANGE UP (ref 1.6–2.6)
MCHC RBC-ENTMCNC: 29.6 PG — SIGNIFICANT CHANGE UP (ref 27–34)
MCHC RBC-ENTMCNC: 32.5 GM/DL — SIGNIFICANT CHANGE UP (ref 32–36)
MCV RBC AUTO: 91.1 FL — SIGNIFICANT CHANGE UP (ref 80–100)
MONOCYTES # BLD AUTO: 0.84 K/UL — SIGNIFICANT CHANGE UP (ref 0–0.9)
MONOCYTES NFR BLD AUTO: 10.6 % — SIGNIFICANT CHANGE UP (ref 2–14)
NEUTROPHILS # BLD AUTO: 4.71 K/UL — SIGNIFICANT CHANGE UP (ref 1.8–7.4)
NEUTROPHILS NFR BLD AUTO: 59.3 % — SIGNIFICANT CHANGE UP (ref 43–77)
NRBC # BLD: 0 /100 WBCS — SIGNIFICANT CHANGE UP (ref 0–0)
PHOSPHATE SERPL-MCNC: 3.1 MG/DL — SIGNIFICANT CHANGE UP (ref 2.5–4.5)
PLATELET # BLD AUTO: 214 K/UL — SIGNIFICANT CHANGE UP (ref 150–400)
POTASSIUM SERPL-MCNC: 3.8 MMOL/L — SIGNIFICANT CHANGE UP (ref 3.5–5.3)
POTASSIUM SERPL-SCNC: 3.8 MMOL/L — SIGNIFICANT CHANGE UP (ref 3.5–5.3)
PROT SERPL-MCNC: 6.4 G/DL — SIGNIFICANT CHANGE UP (ref 6–8.3)
RBC # BLD: 4.16 M/UL — SIGNIFICANT CHANGE UP (ref 3.8–5.2)
RBC # FLD: 14.3 % — SIGNIFICANT CHANGE UP (ref 10.3–14.5)
SODIUM SERPL-SCNC: 137 MMOL/L — SIGNIFICANT CHANGE UP (ref 135–145)
TOTAL CHOLESTEROL/HDL RATIO MEASUREMENT: 3.1 RATIO — LOW (ref 3.3–7.1)
TRIGL SERPL-MCNC: 110 MG/DL — SIGNIFICANT CHANGE UP (ref 10–149)
TSH SERPL-MCNC: 1.91 UIU/ML — SIGNIFICANT CHANGE UP (ref 0.27–4.2)
WBC # BLD: 7.94 K/UL — SIGNIFICANT CHANGE UP (ref 3.8–10.5)
WBC # FLD AUTO: 7.94 K/UL — SIGNIFICANT CHANGE UP (ref 3.8–10.5)

## 2020-09-12 RX ORDER — METOPROLOL TARTRATE 50 MG
25 TABLET ORAL THREE TIMES A DAY
Refills: 0 | Status: DISCONTINUED | OUTPATIENT
Start: 2020-09-12 | End: 2020-09-13

## 2020-09-12 RX ORDER — POTASSIUM CHLORIDE 20 MEQ
40 PACKET (EA) ORAL ONCE
Refills: 0 | Status: COMPLETED | OUTPATIENT
Start: 2020-09-12 | End: 2020-09-12

## 2020-09-12 RX ADMIN — Medication 25 MILLIGRAM(S): at 05:50

## 2020-09-12 RX ADMIN — Medication 75 MICROGRAM(S): at 05:50

## 2020-09-12 RX ADMIN — APIXABAN 5 MILLIGRAM(S): 2.5 TABLET, FILM COATED ORAL at 05:50

## 2020-09-12 RX ADMIN — SIMVASTATIN 20 MILLIGRAM(S): 20 TABLET, FILM COATED ORAL at 21:21

## 2020-09-12 RX ADMIN — Medication 20 MILLIGRAM(S): at 05:50

## 2020-09-12 RX ADMIN — Medication 40 MILLIEQUIVALENT(S): at 09:00

## 2020-09-12 RX ADMIN — APIXABAN 5 MILLIGRAM(S): 2.5 TABLET, FILM COATED ORAL at 17:55

## 2020-09-12 RX ADMIN — Medication 2000 UNIT(S): at 12:57

## 2020-09-12 RX ADMIN — MONTELUKAST 10 MILLIGRAM(S): 4 TABLET, CHEWABLE ORAL at 21:21

## 2020-09-12 RX ADMIN — Medication 25 MILLIGRAM(S): at 21:21

## 2020-09-12 RX ADMIN — Medication 25 MILLIGRAM(S): at 13:01

## 2020-09-12 RX ADMIN — LOSARTAN POTASSIUM 25 MILLIGRAM(S): 100 TABLET, FILM COATED ORAL at 08:24

## 2020-09-12 RX ADMIN — FLUTICASONE FUROATE AND VILANTEROL TRIFENATATE 1 PUFF(S): 100; 25 POWDER RESPIRATORY (INHALATION) at 13:02

## 2020-09-12 NOTE — PROGRESS NOTE ADULT - SUBJECTIVE AND OBJECTIVE BOX
CARDIOLOGY     PROGRESS  NOTE   ________________________________________________    CHIEF COMPLAINT:Patient is a 70y old  Female who presents with a chief complaint of worsening SOB likely due to afib.  Planned BIPIN and DCC (11 Sep 2020 18:30)  no complain.  	  REVIEW OF SYSTEMS:  CONSTITUTIONAL: No fever, weight loss, or fatigue  EYES: No eye pain, visual disturbances, or discharge  ENT:  No difficulty hearing, tinnitus, vertigo; No sinus or throat pain  NECK: No pain or stiffness  RESPIRATORY: No cough, wheezing, chills or hemoptysis; Ndecrease Shortness of Breath  CARDIOVASCULAR: No chest pain, palpitations, passing out, dizziness, or leg swelling  GASTROINTESTINAL: No abdominal or epigastric pain. No nausea, vomiting, or hematemesis; No diarrhea or constipation. No melena or hematochezia.  GENITOURINARY: No dysuria, frequency, hematuria, or incontinence  NEUROLOGICAL: No headaches, memory loss, loss of strength, numbness, or tremors  SKIN: No itching, burning, rashes, or lesions   LYMPH Nodes: No enlarged glands  ENDOCRINE: No heat or cold intolerance; No hair loss  MUSCULOSKELETAL: No joint pain or swelling; No muscle, back, or extremity pain  PSYCHIATRIC: No depression, anxiety, mood swings, or difficulty sleeping  HEME/LYMPH: No easy bruising, or bleeding gums  ALLERGY AND IMMUNOLOGIC: No hives or eczema	    [ ] All others negative	  [ ] Unable to obtain    PHYSICAL EXAM:  T(C): 36.8 (09-12-20 @ 08:12), Max: 36.8 (09-12-20 @ 08:12)  HR: 62 (09-12-20 @ 08:12) (62 - 92)  BP: 99/62 (09-12-20 @ 08:12) (93/60 - 106/67)  RR: 18 (09-12-20 @ 08:12) (18 - 18)  SpO2: 95% (09-12-20 @ 08:12) (92% - 95%)  Wt(kg): --  I&O's Summary    11 Sep 2020 07:01  -  12 Sep 2020 07:00  --------------------------------------------------------  IN: 600 mL / OUT: 0 mL / NET: 600 mL        Appearance: Normal	  HEENT:   Normal oral mucosa, PERRL, EOMI	  Lymphatic: No lymphadenopathy  Cardiovascular: Normal S1 S2, No JVD, + murmurs, No edema  Respiratory: Lungs clear to auscultation	  Psychiatry: A & O x 3, Mood & affect appropriate  Gastrointestinal:  Soft, Non-tender, + BS	  Skin: No rashes, No ecchymoses, No cyanosis	  Neurologic: Non-focal  Extremities: Normal range of motion, No clubbing, cyanosis or edema  Vascular: Peripheral pulses palpable 2+ bilaterally    MEDICATIONS  (STANDING):  apixaban 5 milliGRAM(s) Oral every 12 hours  cholecalciferol 2000 Unit(s) Oral daily  fluticasone furoate/vilanterol 100-25 MICROgram(s) Inhaler 1 Puff(s) Inhalation daily  furosemide    Tablet 20 milliGRAM(s) Oral daily  influenza   Vaccine 0.5 milliLiter(s) IntraMuscular once  levothyroxine 75 MICROGram(s) Oral daily  losartan 25 milliGRAM(s) Oral daily  metoprolol tartrate 25 milliGRAM(s) Oral two times a day  montelukast 10 milliGRAM(s) Oral at bedtime  simvastatin 20 milliGRAM(s) Oral at bedtime  tiotropium 18 MICROgram(s) Capsule 1 Capsule(s) Inhalation daily      TELEMETRY: 	    ECG:  	  RADIOLOGY:  OTHER: 	  	  LABS:	 	    CARDIAC MARKERS:                                12.3   7.94  )-----------( 214      ( 12 Sep 2020 06:04 )             37.9     09-12    137  |  103  |  22  ----------------------------<  95  3.8   |  24  |  0.54    Ca    8.9      12 Sep 2020 06:04  Phos  3.1     09-12  Mg     2.2     09-12    TPro  6.4  /  Alb  3.9  /  TBili  0.4  /  DBili  x   /  AST  16  /  ALT  9<L>  /  AlkPhos  51  09-12    proBNP:   Lipid Profile: Cholesterol 115  LDL 56  HDL 38      HgA1c:   TSH: Thyroid Stimulating Hormone, Serum: 1.91 uIU/mL (09-12 @ 00:21)    PT/INR - ( 10 Sep 2020 14:33 )   PT: 16.5 sec;   INR: 1.41 ratio         PTT - ( 10 Sep 2020 14:33 )  PTT:38.8 sec    < from: Transesophageal Echocardiogram (09.11.20 @ 08:22) >  1. An annuloplasty ring is seen in the mitral position.  Moderate mitral regurgitation. Peak mitral valve gradient  equals 9 mm Hg, mean transmitral valve gradient equals 4 mm  Hg, which is probably normal in the setting of a An  annuloplasty ring is seen in the mitral position..  2. Severely dilated left atrium.  LA volume index = 63  cc/m2.   Spontaneous echo contrast seen.   No left atrial  or left atrial appendage thrombus.   Decreased left atrial  appendage velocities noted.  3. Moderate left ventricular enlargement.  4. Moderate global left ventricular systolic dysfunction.  Septal motion consistent with cardiac surgery.  5. Moderate right atrial enlargement.  6. Right ventricular enlargement with normal right  ventricular systolic function.  7. Normal tricuspid valve. Mild-moderate tricuspid  regurgitation.  8. Estimated pulmonary artery systolic pressure equals 44  mm Hg, assuming right atrial pressure equals 8 mm Hg,  consistent with mild pulmonary pressures.  9. Agitated saline injection and color flow Doppler  demonstrates no evidence of a patent foramen ovale.    < end of copied text >    Assessment and plan  ---------------------------    pt with hx of vhtn , asthma, s/p MV repair few years ago who was seen in the office with increasing sob and palpitation and was found to be in rapid a.fib, pt lopressor has increased and started on ac.  pt presented to er with increasing sob and palpitation.  new onset of chf systolic  with new a.fib  s/p BIPIN/ cardioversion remained in NSR  wct last night, will increases beta blocker as tolerated  cardiomyopathy continue ace/ beta blocker

## 2020-09-12 NOTE — CHART NOTE - NSCHARTNOTEFT_GEN_A_CORE
PA Medicine Event Note    Notified by RN 7 beats WCT noted on tele.  Pt seen at bedside: NAD, laying in bed. Asymptomatic.  K supplemented.  Cardiology following. Metoprolol increased to 25 TID.  Dr. Messina made aware.  Will continue to monitor and endorse to night team.    Dory Smith PA-C  Dept of Medicine  #[id number]

## 2020-09-12 NOTE — PROVIDER CONTACT NOTE (OTHER) - SITUATION
Pt. s/p cardioversion on 9/11/20, 9 beats Wide complex on 9/11/20, 40mEq K administered STAT on 9/11 and labs drawn this am; K - 3.8

## 2020-09-12 NOTE — PROGRESS NOTE ADULT - SUBJECTIVE AND OBJECTIVE BOX
Patient today overall doing ok, comfortable, eating OK.      overall feels better post BIPIN + DCC, remains in Sinus s/p 7 beats of WCT on tele 	    ==================>> REVIEW OF SYSTEM <<=================    GEN: no fever, no chills, no pain  RESP: no SOB, no cough, no sputum  CVS: no chest pain, no palpitations, no edema  GI: no abdominal pain, no nausea, no constipation, no diarrhea  : no dysuria, no frequency, no hematuria  Neuro: no headache, no dizziness  Derm : no itching, no rash    ==================>> PHYSICAL EXAM <<=================    GEN: A&O X 3 , NAD , comfortable  HEENT: NCAT, PERRL, MMM, hearing intact  Neck: supple , no JVD appreciated  CVS: S1S2 , regular , No M/R/G appreciated  PULM: CTA B/L,  no W/R/R appreciated  ABD.: soft. non tender, non distended,  bowel sounds present  Extrem: intact pulses , no edema   PSYCH : normal mood,  not anxious      MEDICATIONS  (STANDING):  apixaban 5 milliGRAM(s) Oral every 12 hours  cholecalciferol 2000 Unit(s) Oral daily  fluticasone furoate/vilanterol 100-25 MICROgram(s) Inhaler 1 Puff(s) Inhalation daily  furosemide    Tablet 20 milliGRAM(s) Oral daily  influenza   Vaccine 0.5 milliLiter(s) IntraMuscular once  levothyroxine 75 MICROGram(s) Oral daily  losartan 25 milliGRAM(s) Oral daily  metoprolol tartrate 25 milliGRAM(s) Oral three times a day  montelukast 10 milliGRAM(s) Oral at bedtime  simvastatin 20 milliGRAM(s) Oral at bedtime  tiotropium 18 MICROgram(s) Capsule 1 Capsule(s) Inhalation daily    MEDICATIONS  (PRN):  ALBUTerol    90 MICROgram(s) HFA Inhaler 2 Puff(s) Inhalation every 6 hours PRN Shortness of Breath and/or Wheezing  ALPRAZolam 0.125 milliGRAM(s) Oral two times a day PRN anxiety    ___________  Active diet:  Diet, Regular  ___________________  T(C): 36.7 (09-12-20 @ 11:52), Max: 36.8 (09-12-20 @ 08:12)  HR: 72 (09-12-20 @ 12:59) (62 - 84)  BP: 109/69 (09-12-20 @ 12:59) (99/62 - 109/69)  RR: 18 (09-12-20 @ 12:59) (18 - 18)  SpO2: 93% (09-12-20 @ 12:59) (93% - 95%)      &O's Summary

## 2020-09-13 VITALS
DIASTOLIC BLOOD PRESSURE: 69 MMHG | OXYGEN SATURATION: 92 % | RESPIRATION RATE: 18 BRPM | SYSTOLIC BLOOD PRESSURE: 103 MMHG | HEART RATE: 71 BPM

## 2020-09-13 LAB
ALBUMIN SERPL ELPH-MCNC: 4 G/DL — SIGNIFICANT CHANGE UP (ref 3.3–5)
ALP SERPL-CCNC: 51 U/L — SIGNIFICANT CHANGE UP (ref 40–120)
ALT FLD-CCNC: 10 U/L — SIGNIFICANT CHANGE UP (ref 10–45)
ANION GAP SERPL CALC-SCNC: 13 MMOL/L — SIGNIFICANT CHANGE UP (ref 5–17)
AST SERPL-CCNC: 16 U/L — SIGNIFICANT CHANGE UP (ref 10–40)
BILIRUB SERPL-MCNC: 0.4 MG/DL — SIGNIFICANT CHANGE UP (ref 0.2–1.2)
BUN SERPL-MCNC: 14 MG/DL — SIGNIFICANT CHANGE UP (ref 7–23)
CALCIUM SERPL-MCNC: 8.7 MG/DL — SIGNIFICANT CHANGE UP (ref 8.4–10.5)
CHLORIDE SERPL-SCNC: 100 MMOL/L — SIGNIFICANT CHANGE UP (ref 96–108)
CO2 SERPL-SCNC: 24 MMOL/L — SIGNIFICANT CHANGE UP (ref 22–31)
CREAT SERPL-MCNC: 0.53 MG/DL — SIGNIFICANT CHANGE UP (ref 0.5–1.3)
GLUCOSE SERPL-MCNC: 90 MG/DL — SIGNIFICANT CHANGE UP (ref 70–99)
HCT VFR BLD CALC: 38.9 % — SIGNIFICANT CHANGE UP (ref 34.5–45)
HGB BLD-MCNC: 12.5 G/DL — SIGNIFICANT CHANGE UP (ref 11.5–15.5)
MAGNESIUM SERPL-MCNC: 2.1 MG/DL — SIGNIFICANT CHANGE UP (ref 1.6–2.6)
MCHC RBC-ENTMCNC: 29.3 PG — SIGNIFICANT CHANGE UP (ref 27–34)
MCHC RBC-ENTMCNC: 32.1 GM/DL — SIGNIFICANT CHANGE UP (ref 32–36)
MCV RBC AUTO: 91.3 FL — SIGNIFICANT CHANGE UP (ref 80–100)
NRBC # BLD: 0 /100 WBCS — SIGNIFICANT CHANGE UP (ref 0–0)
PHOSPHATE SERPL-MCNC: 2.6 MG/DL — SIGNIFICANT CHANGE UP (ref 2.5–4.5)
PLATELET # BLD AUTO: 210 K/UL — SIGNIFICANT CHANGE UP (ref 150–400)
POTASSIUM SERPL-MCNC: 3.7 MMOL/L — SIGNIFICANT CHANGE UP (ref 3.5–5.3)
POTASSIUM SERPL-SCNC: 3.7 MMOL/L — SIGNIFICANT CHANGE UP (ref 3.5–5.3)
PROT SERPL-MCNC: 6.4 G/DL — SIGNIFICANT CHANGE UP (ref 6–8.3)
RBC # BLD: 4.26 M/UL — SIGNIFICANT CHANGE UP (ref 3.8–5.2)
RBC # FLD: 14.2 % — SIGNIFICANT CHANGE UP (ref 10.3–14.5)
SODIUM SERPL-SCNC: 137 MMOL/L — SIGNIFICANT CHANGE UP (ref 135–145)
WBC # BLD: 8.45 K/UL — SIGNIFICANT CHANGE UP (ref 3.8–10.5)
WBC # FLD AUTO: 8.45 K/UL — SIGNIFICANT CHANGE UP (ref 3.8–10.5)

## 2020-09-13 PROCEDURE — 84100 ASSAY OF PHOSPHORUS: CPT

## 2020-09-13 PROCEDURE — 71046 X-RAY EXAM CHEST 2 VIEWS: CPT

## 2020-09-13 PROCEDURE — 83735 ASSAY OF MAGNESIUM: CPT

## 2020-09-13 PROCEDURE — 85730 THROMBOPLASTIN TIME PARTIAL: CPT

## 2020-09-13 PROCEDURE — 80053 COMPREHEN METABOLIC PANEL: CPT

## 2020-09-13 PROCEDURE — 85610 PROTHROMBIN TIME: CPT

## 2020-09-13 PROCEDURE — 84484 ASSAY OF TROPONIN QUANT: CPT

## 2020-09-13 PROCEDURE — U0003: CPT

## 2020-09-13 PROCEDURE — 80061 LIPID PANEL: CPT

## 2020-09-13 PROCEDURE — 93312 ECHO TRANSESOPHAGEAL: CPT

## 2020-09-13 PROCEDURE — 86803 HEPATITIS C AB TEST: CPT

## 2020-09-13 PROCEDURE — 84443 ASSAY THYROID STIM HORMONE: CPT

## 2020-09-13 PROCEDURE — 80048 BASIC METABOLIC PNL TOTAL CA: CPT

## 2020-09-13 PROCEDURE — 85025 COMPLETE CBC W/AUTO DIFF WBC: CPT

## 2020-09-13 PROCEDURE — 85027 COMPLETE CBC AUTOMATED: CPT

## 2020-09-13 PROCEDURE — 94640 AIRWAY INHALATION TREATMENT: CPT

## 2020-09-13 PROCEDURE — 93005 ELECTROCARDIOGRAM TRACING: CPT

## 2020-09-13 PROCEDURE — 99285 EMERGENCY DEPT VISIT HI MDM: CPT | Mod: 25

## 2020-09-13 PROCEDURE — 93306 TTE W/DOPPLER COMPLETE: CPT

## 2020-09-13 RX ORDER — METOPROLOL TARTRATE 50 MG
1 TABLET ORAL
Qty: 90 | Refills: 0
Start: 2020-09-13 | End: 2020-10-12

## 2020-09-13 RX ADMIN — Medication 25 MILLIGRAM(S): at 05:55

## 2020-09-13 RX ADMIN — FLUTICASONE FUROATE AND VILANTEROL TRIFENATATE 1 PUFF(S): 100; 25 POWDER RESPIRATORY (INHALATION) at 11:56

## 2020-09-13 RX ADMIN — Medication 75 MICROGRAM(S): at 05:55

## 2020-09-13 RX ADMIN — APIXABAN 5 MILLIGRAM(S): 2.5 TABLET, FILM COATED ORAL at 05:55

## 2020-09-13 RX ADMIN — Medication 2000 UNIT(S): at 11:56

## 2020-09-13 RX ADMIN — Medication 25 MILLIGRAM(S): at 13:43

## 2020-09-13 RX ADMIN — LOSARTAN POTASSIUM 25 MILLIGRAM(S): 100 TABLET, FILM COATED ORAL at 05:55

## 2020-09-13 RX ADMIN — Medication 20 MILLIGRAM(S): at 05:55

## 2020-09-13 NOTE — PROGRESS NOTE ADULT - ASSESSMENT
M E D I C A L   A T T E N D I N G    F O L L O W    U P   N O T E                                     ------------------------------------------------------------------------------------------------    patient evaluated by me, case discussed with team, chart, medications, and physical exam reviewed, labs / tests  and vitals reviewed by me, as marlen.   Patient is stable for discharge today.  Patient to follow up with  Dr Espinoza  See discharge document for full note.      ==================>> MEDICATIONS <<====================    apixaban 5 milliGRAM(s) Oral every 12 hours  cholecalciferol 2000 Unit(s) Oral daily  fluticasone furoate/vilanterol 100-25 MICROgram(s) Inhaler 1 Puff(s) Inhalation daily  furosemide    Tablet 20 milliGRAM(s) Oral daily  influenza   Vaccine 0.5 milliLiter(s) IntraMuscular once  levothyroxine 75 MICROGram(s) Oral daily  losartan 25 milliGRAM(s) Oral daily  metoprolol tartrate 25 milliGRAM(s) Oral three times a day  montelukast 10 milliGRAM(s) Oral at bedtime  simvastatin 20 milliGRAM(s) Oral at bedtime  tiotropium 18 MICROgram(s) Capsule 1 Capsule(s) Inhalation daily    MEDICATIONS  (PRN):  ALBUTerol    90 MICROgram(s) HFA Inhaler 2 Puff(s) Inhalation every 6 hours PRN Shortness of Breath and/or Wheezing  ALPRAZolam 0.125 milliGRAM(s) Oral two times a day PRN anxiety    ___________  Active diet:  Diet, Regular  ___________________    ==================>> VITAL SIGNS <<==================    T(C): 36.4 (09-13-20 @ 11:25), Max: 36.6 (09-13-20 @ 04:33)  HR: 71 (09-13-20 @ 15:12) (64 - 71)  BP: 103/69 (09-13-20 @ 15:12) (96/66 - 119/75)  RR: 18 (09-13-20 @ 15:12) (18 - 18)  SpO2: 92% (09-13-20 @ 15:12) (91% - 94%)     I&O's Summary    12 Sep 2020 07:01  -  13 Sep 2020 07:00  --------------------------------------------------------  IN: 300 mL / OUT: 0 mL / NET: 300 mL    13 Sep 2020 07:01  -  13 Sep 2020 16:15  --------------------------------------------------------  IN: 480 mL / OUT: 0 mL / NET: 480 mL       ==================>> LAB AND IMAGING <<==================                        12.5   8.45  )-----------( 210      ( 13 Sep 2020 07:12 )             38.9        09-13    137  |  100  |  14  ----------------------------<  90  3.7   |  24  |  0.53    Ca    8.7      13 Sep 2020 07:12  Phos  2.6     09-13  Mg     2.1     09-13    TPro  6.4  /  Alb  4.0  /  TBili  0.4  /  DBili  x   /  AST  16  /  ALT  10  /  AlkPhos  51  09-13     TSH:      1.91   (09-12-20)           Lipid profile:  (09-12-20)     Total: 115     LDL  : 56     HDL  :38     TG   :110       WBC count:   8.45 <<== ,  7.94 <<== ,  10.18 <<== ,  11.05 <<==   Hemoglobin:   12.5 <<==,  12.3 <<==,  12.9 <<==,  14.4 <<==  platelets:  210 <==, 214 <==, 234 <==, 284 <==    Creatinine:  0.53  <<==, 0.54  <<==, 0.67  <<==, 0.60  <<==  Sodium:   137  <==, 137  <==, 135  <==, 135  <==       AST:          16 <== , 16 <== , 16 <==      ALT:        10  <== , 9  <== , 13  <==      AP:        51  <=, 51  <=, 63  <=     Bili:        0.4  <=, 0.4  <=, 0.5  <=       
_________________________________________________________________________________________  ========>>  M E D I C A L   A T T E N D I N G    F O L L O W  U P  N O T E  <<=========  -----------------------------------------------------------------------------------------------------    - Patient seen and examined by me approximately sixty minutes ago.  - In summary,  EDGAR HAMILTON is a 70y year old woman who originally presented with Afib  - Patient today overall doing ok, comfortable, eating OK.      overall feels better post BIPIN + DCC, remains in Sinus on tele 	    ==================>> REVIEW OF SYSTEM <<=================    GEN: no fever, no chills, no pain  RESP: no SOB, no cough, no sputum  CVS: no chest pain, no palpitations, no edema  GI: no abdominal pain, no nausea, no constipation, no diarrhea  : no dysuria, no frequency, no hematuria  Neuro: no headache, no dizziness  Derm : no itching, no rash    ==================>> PHYSICAL EXAM <<=================    GEN: A&O X 3 , NAD , comfortable  HEENT: NCAT, PERRL, MMM, hearing intact  Neck: supple , no JVD appreciated  CVS: S1S2 , regular , No M/R/G appreciated  PULM: CTA B/L,  no W/R/R appreciated  ABD.: soft. non tender, non distended,  bowel sounds present  Extrem: intact pulses , no edema   PSYCH : normal mood,  not anxious      ==================>> MEDICATIONS <<====================    MEDICATIONS  (STANDING):  apixaban 5 milliGRAM(s) Oral every 12 hours  budesonide  80 MICROgram(s)/formoterol 4.5 MICROgram(s) Inhaler 2 Puff(s) Inhalation two times a day  cholecalciferol 2000 Unit(s) Oral daily  furosemide    Tablet 20 milliGRAM(s) Oral daily  influenza   Vaccine 0.5 milliLiter(s) IntraMuscular once  levothyroxine 75 MICROGram(s) Oral daily  losartan 25 milliGRAM(s) Oral daily  metoprolol tartrate 25 milliGRAM(s) Oral two times a day  montelukast 10 milliGRAM(s) Oral at bedtime  simvastatin 20 milliGRAM(s) Oral at bedtime  tiotropium 18 MICROgram(s) Capsule 1 Capsule(s) Inhalation daily    MEDICATIONS  (PRN):  ALBUTerol    90 MICROgram(s) HFA Inhaler 2 Puff(s) Inhalation every 6 hours PRN Shortness of Breath and/or Wheezing  ALPRAZolam 0.125 milliGRAM(s) Oral two times a day PRN anxiety    ___________  Active diet:  Diet, Regular  ___________________    ==================>> VITAL SIGNS <<==================    T(C): 36.4 (09-11-20 @ 11:41), Max: 36.7 (09-11-20 @ 04:47)  HR: 68 (09-11-20 @ 11:41) (65 - 90)  BP: 102/67 (09-11-20 @ 11:41) (102/67 - 137/85)  BP(mean): 95 (09-10-20 @ 19:35)  RR: 18 (09-11-20 @ 11:41) (14 - 18)  SpO2: 93% (09-11-20 @ 11:41) (93% - 95%)     I&O's Summary    11 Sep 2020 07:01  -  11 Sep 2020 15:09  --------------------------------------------------------  IN: 360 mL / OUT: 0 mL / NET: 360 mL     ==================>> LAB AND IMAGING <<==================                        14.4   11.05 )-----------( 284      ( 10 Sep 2020 14:33 )             43.7        09-10    135  |  97  |  13  ----------------------------<  107<H>  3.9   |  22  |  0.60    Ca    9.4      10 Sep 2020 14:33    TPro  7.7  /  Alb  4.7  /  TBili  0.5  /  DBili  x   /  AST  16  /  ALT  13  /  AlkPhos  63  09-10    PT/INR - ( 10 Sep 2020 14:33 )   PT: 16.5 sec;   INR: 1.41 ratio    PTT - ( 10 Sep 2020 14:33 )  PTT:38.8 sec              < from: Transesophageal Echocardiogram (09.11.20 @ 08:22) >  Conclusions:  1. An annuloplasty ring is seen in the mitral position.  Moderate mitral regurgitation. Peak mitral valve gradient  equals 9 mm Hg, mean transmitral valve gradient equals 4 mm  Hg, which is probably normal in the setting of a An  annuloplasty ring is seen in the mitral position..  2. Severely dilated left atrium.  LA volume index = 63  cc/m2.   Spontaneous echo contrast seen.   No left atrial  or left atrial appendage thrombus.   Decreased left atrial  appendage velocities noted.  3. Moderate left ventricular enlargement.  4. Moderate global left ventricular systolic dysfunction.  Septal motion consistent with cardiac surgery.  5. Moderate right atrial enlargement.  6. Right ventricular enlargement with normal right  ventricular systolic function.  7. Normal tricuspid valve. Mild-moderate tricuspid regurgitation.  8. Estimated pulmonary artery systolic pressure equals 44  mm Hg, assuming right atrial pressure equals 8 mm Hg,  consistent with mild pulmonary pressures.  9. Agitated saline injection and color flow Doppler  demonstrates no evidence of a patent foramen ovale.  *** No recent echocardiogram for comparison.  Reviewed with Dr. Espinoza.  ADDENDUM 9/11/2020: added fellows name  ------------------------------------------------------------------------  Revised on 9/11/2020 - 2:02 PM by JUDY Mcmullen  < end of copied text >    ___________________________________________________________________________________  ===============>>  A S S E S S M E N T   VIRIDIANA N IVONNE   P L A N <<===============  ------------------------------------------------------------------------------------------    Problem/Plan - 1:  ·  Problem: Other persistent atrial fibrillation.    appreciated cardiology   doing well post BIPIN + DCC   continue beta blocker  continue AC  tele monitoring.   adjust meds as needed per cardio  likely DC home in AM if stable     Problem/Plan - 2:  ·  Problem: Acquired hypothyroidism.  Plan: synthroid  TSH.     Problem/Plan - 3:  ·  Problem: Essential hypertension.  Plan: Continue Current medications otherwise and monitor.     Problem/Plan - 4:  ·  Problem: Mitral valve insufficiency, post mitral valve procedure  BIPIN as above  cardio appreciated.     chronic systolic CHF, stable  Continue Current medications otherwise and monitor.    Problem/Plan - 5:  ·  Problem: Anxiety.  Plan: pRN Xanax.       -GI/DVT Prophylaxis per protocol.    --------------------------------------------  Case discussed with pt, family.. NP  Education given on findings and plan of care  ___________________________  H. RICK Messina.  Pager: [phone]
___________________________________________________________________________________  ===============>>  A S S E S S M E N T   A N D   P L A N <<===============  ------------------------------------------------------------------------------------------    Problem/Plan - 1:  ·  Problem: Other persistent atrial fibrillation.    appreciated cardiology   doing well post BIPIN + DCC   continue beta blocker- increased dose today for 7 beats WCT   continue AC  tele monitoring.   adjust meds as needed per cardio      Problem/Plan - 2:  ·  Problem: Acquired hypothyroidism.  Plan: synthroid  TSH.     Problem/Plan - 3:  ·  Problem: Essential hypertension.  Plan: Continue Current medications otherwise and monitor.     Problem/Plan - 4:  ·  Problem: Mitral valve insufficiency, post mitral valve procedure  BIPIN as above  cardio appreciated.     chronic systolic CHF, stable  Continue Current medications otherwise and monitor.    Problem/Plan - 5:  ·  Problem: Anxiety.  Plan: pRN Xanax.     -GI/DVT Prophylaxis per protocol.

## 2020-09-13 NOTE — PROGRESS NOTE ADULT - SUBJECTIVE AND OBJECTIVE BOX
CARDIOLOGY     PROGRESS  NOTE   ________________________________________________    CHIEF COMPLAINT:Patient is a 70y old  Female who presents with a chief complaint of Dr Espinoza (12 Sep 2020 14:06)  no complain  	  REVIEW OF SYSTEMS:  CONSTITUTIONAL: No fever, weight loss, or fatigue  EYES: No eye pain, visual disturbances, or discharge  ENT:  No difficulty hearing, tinnitus, vertigo; No sinus or throat pain  NECK: No pain or stiffness  RESPIRATORY: No cough, wheezing, chills or hemoptysis; No Shortness of Breath  CARDIOVASCULAR: No chest pain, palpitations, passing out, dizziness, or leg swelling  GASTROINTESTINAL: No abdominal or epigastric pain. No nausea, vomiting, or hematemesis; No diarrhea or constipation. No melena or hematochezia.  GENITOURINARY: No dysuria, frequency, hematuria, or incontinence  NEUROLOGICAL: No headaches, memory loss, loss of strength, numbness, or tremors  SKIN: No itching, burning, rashes, or lesions   LYMPH Nodes: No enlarged glands  ENDOCRINE: No heat or cold intolerance; No hair loss  MUSCULOSKELETAL: No joint pain or swelling; No muscle, back, or extremity pain  PSYCHIATRIC: No depression, anxiety, mood swings, or difficulty sleeping  HEME/LYMPH: No easy bruising, or bleeding gums  ALLERGY AND IMMUNOLOGIC: No hives or eczema	    [ ] All others negative	  [ ] Unable to obtain    PHYSICAL EXAM:  T(C): 36.6 (09-13-20 @ 04:33), Max: 36.7 (09-12-20 @ 11:52)  HR: 64 (09-13-20 @ 04:33) (64 - 72)  BP: 110/75 (09-13-20 @ 04:33) (103/66 - 119/75)  RR: 18 (09-13-20 @ 04:33) (18 - 18)  SpO2: 91% (09-13-20 @ 04:33) (91% - 94%)  Wt(kg): --  I&O's Summary    12 Sep 2020 07:01  -  13 Sep 2020 07:00  --------------------------------------------------------  IN: 300 mL / OUT: 0 mL / NET: 300 mL        Appearance: Normal	  HEENT:   Normal oral mucosa, PERRL, EOMI	  Lymphatic: No lymphadenopathy  Cardiovascular: Normal S1 S2, No JVD, + murmurs, No edema  Respiratory: Lungs clear to auscultation	  Psychiatry: A & O x 3, Mood & affect appropriate  Gastrointestinal:  Soft, Non-tender, + BS	  Skin: No rashes, No ecchymoses, No cyanosis	  Neurologic: Non-focal  Extremities: Normal range of motion, No clubbing, cyanosis or edema  Vascular: Peripheral pulses palpable 2+ bilaterally    MEDICATIONS  (STANDING):  apixaban 5 milliGRAM(s) Oral every 12 hours  cholecalciferol 2000 Unit(s) Oral daily  fluticasone furoate/vilanterol 100-25 MICROgram(s) Inhaler 1 Puff(s) Inhalation daily  furosemide    Tablet 20 milliGRAM(s) Oral daily  influenza   Vaccine 0.5 milliLiter(s) IntraMuscular once  levothyroxine 75 MICROGram(s) Oral daily  losartan 25 milliGRAM(s) Oral daily  metoprolol tartrate 25 milliGRAM(s) Oral three times a day  montelukast 10 milliGRAM(s) Oral at bedtime  simvastatin 20 milliGRAM(s) Oral at bedtime  tiotropium 18 MICROgram(s) Capsule 1 Capsule(s) Inhalation daily      TELEMETRY: 	    ECG:  	  RADIOLOGY:  OTHER: 	  	  LABS:	 	    CARDIAC MARKERS:                                12.5   8.45  )-----------( 210      ( 13 Sep 2020 07:12 )             38.9     09-13    137  |  100  |  14  ----------------------------<  90  3.7   |  24  |  0.53    Ca    8.7      13 Sep 2020 07:12  Phos  2.6     09-13  Mg     2.1     09-13    TPro  6.4  /  Alb  4.0  /  TBili  0.4  /  DBili  x   /  AST  16  /  ALT  10  /  AlkPhos  51  09-13    proBNP:   Lipid Profile: Cholesterol 115  LDL 56  HDL 38      HgA1c:   TSH: Thyroid Stimulating Hormone, Serum: 1.91 uIU/mL (09-12 @ 00:21)          Assessment and plan  ---------------------------  pt with hx of vhtn , asthma, s/p MV repair few years ago who was seen in the office with increasing sob and palpitation and was found to be in rapid a.fib, pt lopressor has increased and started on ac.  pt presented to er with increasing sob and palpitation.  new onset of chf systolic  with new a.fib  s/p BIPIN/ cardioversion remained in NSR  no further arrythmia remained in NSR  cardiomyopathy continue ace/ beta blocker  increase ambulation

## 2020-09-13 NOTE — CHART NOTE - NSCHARTNOTEFT_GEN_A_CORE
[facility] Discharge Note    Patient medically cleared for discharge today by Dr. Messina.  BP values discussed with Dr. Messina: 96/66 this afternoon, rechecked 103/69 (after afternoon lopressor dose).  Pt asymptomatic, NAD, walking around with no complaints. W/o dizziness, palpitations, abd pain, cp, sob, N/V, HA.  Medications for discharge confirmed with attending including lopressor 25 TID.  Discharge plan and follow up instructions discussed with patient.    Dory Smith PA-C  Dept of Medicine  #[id number]

## 2021-03-08 ENCOUNTER — INPATIENT (INPATIENT)
Facility: HOSPITAL | Age: 71
LOS: 2 days | Discharge: HOME | DRG: 225 | End: 2021-03-11
Attending: INTERNAL MEDICINE | Admitting: INTERNAL MEDICINE
Payer: MEDICARE

## 2021-03-08 VITALS
HEIGHT: 63 IN | WEIGHT: 171.08 LBS | OXYGEN SATURATION: 94 % | RESPIRATION RATE: 16 BRPM | SYSTOLIC BLOOD PRESSURE: 127 MMHG | TEMPERATURE: 98 F | DIASTOLIC BLOOD PRESSURE: 89 MMHG | HEART RATE: 79 BPM

## 2021-03-08 DIAGNOSIS — Z98.89 OTHER SPECIFIED POSTPROCEDURAL STATES: Chronic | ICD-10-CM

## 2021-03-08 DIAGNOSIS — I49.9 CARDIAC ARRHYTHMIA, UNSPECIFIED: ICD-10-CM

## 2021-03-08 LAB
ALBUMIN SERPL ELPH-MCNC: 4.4 G/DL — SIGNIFICANT CHANGE UP (ref 3.3–5)
ALP SERPL-CCNC: 72 U/L — SIGNIFICANT CHANGE UP (ref 40–120)
ALT FLD-CCNC: 13 U/L — SIGNIFICANT CHANGE UP (ref 10–45)
ANION GAP SERPL CALC-SCNC: 14 MMOL/L — SIGNIFICANT CHANGE UP (ref 5–17)
APTT BLD: 39.6 SEC — HIGH (ref 27.5–35.5)
AST SERPL-CCNC: 15 U/L — SIGNIFICANT CHANGE UP (ref 10–40)
BASOPHILS # BLD AUTO: 0.03 K/UL — SIGNIFICANT CHANGE UP (ref 0–0.2)
BASOPHILS NFR BLD AUTO: 0.3 % — SIGNIFICANT CHANGE UP (ref 0–2)
BILIRUB SERPL-MCNC: 0.4 MG/DL — SIGNIFICANT CHANGE UP (ref 0.2–1.2)
BLD GP AB SCN SERPL QL: NEGATIVE — SIGNIFICANT CHANGE UP
BUN SERPL-MCNC: 16 MG/DL — SIGNIFICANT CHANGE UP (ref 7–23)
CALCIUM SERPL-MCNC: 10.4 MG/DL — SIGNIFICANT CHANGE UP (ref 8.4–10.5)
CHLORIDE SERPL-SCNC: 100 MMOL/L — SIGNIFICANT CHANGE UP (ref 96–108)
CO2 SERPL-SCNC: 24 MMOL/L — SIGNIFICANT CHANGE UP (ref 22–31)
CREAT SERPL-MCNC: 0.73 MG/DL — SIGNIFICANT CHANGE UP (ref 0.5–1.3)
EOSINOPHIL # BLD AUTO: 0.08 K/UL — SIGNIFICANT CHANGE UP (ref 0–0.5)
EOSINOPHIL NFR BLD AUTO: 0.8 % — SIGNIFICANT CHANGE UP (ref 0–6)
GLUCOSE SERPL-MCNC: 96 MG/DL — SIGNIFICANT CHANGE UP (ref 70–99)
HCT VFR BLD CALC: 43.8 % — SIGNIFICANT CHANGE UP (ref 34.5–45)
HGB BLD-MCNC: 14.2 G/DL — SIGNIFICANT CHANGE UP (ref 11.5–15.5)
IMM GRANULOCYTES NFR BLD AUTO: 0.2 % — SIGNIFICANT CHANGE UP (ref 0–1.5)
INR BLD: 1.39 RATIO — HIGH (ref 0.88–1.16)
LYMPHOCYTES # BLD AUTO: 2.74 K/UL — SIGNIFICANT CHANGE UP (ref 1–3.3)
LYMPHOCYTES # BLD AUTO: 26.8 % — SIGNIFICANT CHANGE UP (ref 13–44)
MCHC RBC-ENTMCNC: 29.6 PG — SIGNIFICANT CHANGE UP (ref 27–34)
MCHC RBC-ENTMCNC: 32.4 GM/DL — SIGNIFICANT CHANGE UP (ref 32–36)
MCV RBC AUTO: 91.4 FL — SIGNIFICANT CHANGE UP (ref 80–100)
MONOCYTES # BLD AUTO: 1.04 K/UL — HIGH (ref 0–0.9)
MONOCYTES NFR BLD AUTO: 10.2 % — SIGNIFICANT CHANGE UP (ref 2–14)
NEUTROPHILS # BLD AUTO: 6.31 K/UL — SIGNIFICANT CHANGE UP (ref 1.8–7.4)
NEUTROPHILS NFR BLD AUTO: 61.7 % — SIGNIFICANT CHANGE UP (ref 43–77)
NRBC # BLD: 0 /100 WBCS — SIGNIFICANT CHANGE UP (ref 0–0)
NT-PROBNP SERPL-SCNC: 874 PG/ML — HIGH (ref 0–300)
PLATELET # BLD AUTO: 280 K/UL — SIGNIFICANT CHANGE UP (ref 150–400)
POTASSIUM SERPL-MCNC: 3.8 MMOL/L — SIGNIFICANT CHANGE UP (ref 3.5–5.3)
POTASSIUM SERPL-SCNC: 3.8 MMOL/L — SIGNIFICANT CHANGE UP (ref 3.5–5.3)
PROT SERPL-MCNC: 7.8 G/DL — SIGNIFICANT CHANGE UP (ref 6–8.3)
PROTHROM AB SERPL-ACNC: 16.4 SEC — HIGH (ref 10.6–13.6)
RBC # BLD: 4.79 M/UL — SIGNIFICANT CHANGE UP (ref 3.8–5.2)
RBC # FLD: 13.2 % — SIGNIFICANT CHANGE UP (ref 10.3–14.5)
RH IG SCN BLD-IMP: POSITIVE — SIGNIFICANT CHANGE UP
SARS-COV-2 RNA SPEC QL NAA+PROBE: SIGNIFICANT CHANGE UP
SODIUM SERPL-SCNC: 138 MMOL/L — SIGNIFICANT CHANGE UP (ref 135–145)
TROPONIN T, HIGH SENSITIVITY RESULT: 15 NG/L — SIGNIFICANT CHANGE UP (ref 0–51)
WBC # BLD: 10.22 K/UL — SIGNIFICANT CHANGE UP (ref 3.8–10.5)
WBC # FLD AUTO: 10.22 K/UL — SIGNIFICANT CHANGE UP (ref 3.8–10.5)

## 2021-03-08 PROCEDURE — 71045 X-RAY EXAM CHEST 1 VIEW: CPT | Mod: 26

## 2021-03-08 PROCEDURE — 93010 ELECTROCARDIOGRAM REPORT: CPT

## 2021-03-08 PROCEDURE — 99285 EMERGENCY DEPT VISIT HI MDM: CPT

## 2021-03-08 RX ORDER — MONTELUKAST 4 MG/1
10 TABLET, CHEWABLE ORAL AT BEDTIME
Refills: 0 | Status: DISCONTINUED | OUTPATIENT
Start: 2021-03-08 | End: 2021-03-11

## 2021-03-08 RX ORDER — BUDESONIDE, MICRONIZED 100 %
0.25 POWDER (GRAM) MISCELLANEOUS
Refills: 0 | Status: DISCONTINUED | OUTPATIENT
Start: 2021-03-08 | End: 2021-03-11

## 2021-03-08 RX ORDER — SIMVASTATIN 20 MG/1
20 TABLET, FILM COATED ORAL AT BEDTIME
Refills: 0 | Status: DISCONTINUED | OUTPATIENT
Start: 2021-03-08 | End: 2021-03-11

## 2021-03-08 RX ORDER — ALPRAZOLAM 0.25 MG
0.5 TABLET ORAL
Qty: 0 | Refills: 0 | DISCHARGE

## 2021-03-08 RX ORDER — ALPRAZOLAM 0.25 MG
0.25 TABLET ORAL
Refills: 0 | Status: DISCONTINUED | OUTPATIENT
Start: 2021-03-08 | End: 2021-03-11

## 2021-03-08 RX ORDER — FUROSEMIDE 40 MG
20 TABLET ORAL DAILY
Refills: 0 | Status: DISCONTINUED | OUTPATIENT
Start: 2021-03-08 | End: 2021-03-11

## 2021-03-08 RX ORDER — IPRATROPIUM/ALBUTEROL SULFATE 18-103MCG
3 AEROSOL WITH ADAPTER (GRAM) INHALATION
Qty: 0 | Refills: 0 | DISCHARGE

## 2021-03-08 RX ORDER — LEVOTHYROXINE SODIUM 125 MCG
75 TABLET ORAL DAILY
Refills: 0 | Status: DISCONTINUED | OUTPATIENT
Start: 2021-03-08 | End: 2021-03-11

## 2021-03-08 RX ORDER — LOSARTAN POTASSIUM 100 MG/1
25 TABLET, FILM COATED ORAL DAILY
Refills: 0 | Status: DISCONTINUED | OUTPATIENT
Start: 2021-03-08 | End: 2021-03-11

## 2021-03-08 RX ORDER — POTASSIUM CHLORIDE 20 MEQ
10 PACKET (EA) ORAL DAILY
Refills: 0 | Status: DISCONTINUED | OUTPATIENT
Start: 2021-03-08 | End: 2021-03-11

## 2021-03-08 RX ADMIN — MONTELUKAST 10 MILLIGRAM(S): 4 TABLET, CHEWABLE ORAL at 20:57

## 2021-03-08 RX ADMIN — SIMVASTATIN 20 MILLIGRAM(S): 20 TABLET, FILM COATED ORAL at 20:57

## 2021-03-08 NOTE — H&P ADULT - HISTORY OF PRESENT ILLNESS
CHIEF COMPLAINT:Patient is a 71y old  Female who presents with a chief complaint of sob/palpitation.    HPI:  71 F hx of COPD, hypothyroidism, HTN, AF s/p ablation in Dec 2020, on eliquis, MV repair in , cardioversion in 2020 b/c of AFib, had an echo today withn ef 25 % with LVE and lae with mod MR, who recently was seen by Dr Vargas whohad a holter monitor who was advise to have AICD was seen in the office with increasing palpitaion and holter with multiple episodes of VT,and was sent to er.  pt had echo before with ef of 35 % and underwent ablation for A.fib 3 months ago with no sig change in EF. pt is been treated with beta blocker and losartan for heart failure for many years since the MV repair.    PAST MEDICAL & SURGICAL HISTORY:  Osteoporosis    Hypothyroid    Syncope    Cardiac arrhythmia    Varicose vein    HTN (hypertension)    Mitral regurgitation    Mitral valve prolapse    S/P mitral valve repair    History of varicose vein stripping     delivery NOS        MEDICATIONS  (STANDING):  noted    MEDICATIONS  (PRN):      FAMILY HISTORY:  Family history of lung cancer    Family history of CHF (congestive heart failure) (Father, Mother)        SOCIAL HISTORY:    [ ] Non-smoker  [ ] Smoker  [ ] Alcohol    Allergies    No Known Allergies    Intolerances    	    REVIEW OF SYSTEMS:  CONSTITUTIONAL: No fever, weight loss, or fatigue  EYES: No eye pain, visual disturbances, or discharge  ENT:  No difficulty hearing, tinnitus, vertigo; No sinus or throat pain  NECK: No pain or stiffness  RESPIRATORY: No cough, wheezing, chills or hemoptysis; + Shortness of Breath  CARDIOVASCULAR: No chest pain, palpitations, passing out, dizziness, or leg swelling  GASTROINTESTINAL: No abdominal or epigastric pain. No nausea, vomiting, or hematemesis; No diarrhea or constipation. No melena or hematochezia.  GENITOURINARY: No dysuria, frequency, hematuria, or incontinence  NEUROLOGICAL: No headaches, memory loss, loss of strength, numbness, or tremors  SKIN: No itching, burning, rashes, or lesions   LYMPH Nodes: No enlarged glands  ENDOCRINE: No heat or cold intolerance; No hair loss  MUSCULOSKELETAL: No joint pain or swelling; No muscle, back, or extremity pain  PSYCHIATRIC: No depression, anxiety, mood swings, or difficulty sleeping  HEME/LYMPH: No easy bruising, or bleeding gums  ALLERGY AND IMMUNOLOGIC: No hives or eczema	    [ ] All others negative	  [ ] Unable to obtain    PHYSICAL EXAM:  T(C): 36.5 (21 @ 18:17), Max: 36.5 (21 @ 18:17)  HR: 73 (21 @ 18:17) (73 - 79)  BP: 126/86 (21 @ 18:17) (27/89 - 127/89)  RR: 16 (21 @ 18:17) (16 - 16)  SpO2: 96% (21 @ 18:17) (94% - 96%)  Wt(kg): --  I&O's Summary      Appearance: Normal	  HEENT:   Normal oral mucosa, PERRL, EOMI	  Lymphatic: No lymphadenopathy  Cardiovascular: Normal S1 S2, No JVD, + murmurs, + edema  Respiratory: decrease bs	  Psychiatry: A & O x 3, Mood & affect appropriate  Gastrointestinal:  Soft, Non-tender, + BS	  Skin: No rashes, No ecchymoses, No cyanosis	  Neurologic: Non-focal  Extremities: Normal range of motion, No clubbing, cyanosis , + edema  Vascular: Peripheral pulses palpable 2+ bilaterally    TELEMETRY: 	    ECG:  	  RADIOLOGY:  OTHER: 	  	  LABS:	 	    CARDIAC MARKERS:                              14.2   10.22 )-----------( 280      ( 08 Mar 2021 18:25 )             43.8         138  |  100  |  16  ----------------------------<  96  3.8   |  24  |  0.73    Ca    10.4      08 Mar 2021 18:25    TPro  7.8  /  Alb  4.4  /  TBili  0.4  /  DBili  x   /  AST  15  /  ALT  13  /  AlkPhos  72  -    proBNP: Serum Pro-Brain Natriuretic Peptide: 874 pg/mL ( @ 18:25)    Lipid Profile:   HgA1c:   TSH:   PT/INR - ( 08 Mar 2021 18:25 )   PT: 16.4 sec;   INR: 1.39 ratio         PTT - ( 08 Mar 2021 18:25 )  PTT:39.6 sec    PREVIOUS DIAGNOSTIC TESTING:    < from: 12 Lead ECG (20 @ 09:55) >  Diagnosis Line SINUS BRADYCARDIA WITH 1ST DEGREE A-V BLOCK  NONSPECIFIC ST ABNORMALITY  ABNORMAL ECG  WHEN COMPARED WITH ECG OF 11-SEP-2020 07:55,  SIGNIFICANT CHANGES HAVE OCCURRED    < from: Transesophageal Echocardiogram (20 @ 08:22) >  1. An annuloplasty ring is seen in the mitral position.  Moderate mitral regurgitation. Peak mitral valve gradient  equals 9 mm Hg, mean transmitral valve gradient equals 4 mm  Hg, which is probably normal in the setting of a An  annuloplasty ring is seen in the mitral position..  2. Severely dilated left atrium.  LA volume index = 63  cc/m2.   Spontaneous echo contrast seen.   No left atrial  or left atrial appendage thrombus.   Decreased left atrial  appendage velocities noted.  3. Moderate left ventricular enlargement.  4. Moderate global left ventricular systolic dysfunction.  Septal motion consistent with cardiac surgery.  5. Moderate right atrial enlargement.  6. Right ventricular enlargement with normal right  ventricular systolic function.  7. Normal tricuspid valve. Mild-moderate tricuspid  regurgitation.  8. Estimated pulmonary artery systolic pressure equals 44  mm Hg, assuming right atrial pressure equals 8 mm Hg,  consistent with mild pulmonary pressures.  9. Agitated saline injection and color flow Doppler  demonstrates no evidence of a patent foramen ovale.    < from: Xray Chest 1 View- PORTABLE-Urgent (Xray Chest 1 View- PORTABLE-Urgent .) (21 @ 19:07) >  INTERPRETATION:  no emergent findings

## 2021-03-08 NOTE — ED ADULT TRIAGE NOTE - CHIEF COMPLAINT QUOTE
Pt has no complaints at this time pt sent I by Dr Espinoza Pt had  an ablation  in Dec for A fib  today pt had a monitor 2/15-3/01 that had a episode of V tach  echo done today and ekg done sent in  foro ACID and angio gram

## 2021-03-08 NOTE — H&P ADULT - NSICDXPASTMEDICALHX_GEN_ALL_CORE_FT
PAST MEDICAL HISTORY:  Cardiac arrhythmia     HTN (hypertension)     Hypothyroid     Mitral regurgitation     Mitral valve prolapse     Osteoporosis     Syncope     Varicose vein

## 2021-03-08 NOTE — ED PROVIDER NOTE - CLINICAL SUMMARY MEDICAL DECISION MAKING FREE TEXT BOX
Pt presenting for SoB and abnormal findings on echo and recent loop recorder/other event device. On exam mild dyspnea. Per papers she presents with pt is TBA to Dr Espinoza. Will get preop labs and CXR, EKG, and call Dr Espinoza.

## 2021-03-08 NOTE — ED PROVIDER NOTE - OBJECTIVE STATEMENT
71 F hx of COPD, hypothyroidism, HTN, AF s/p ablation in Dec 2020, on eliquis, MV repair in 2014, cardioversion in Sept 2020 b/c of AFib, had an echo today with Dr Dequan Espinoza, was told it was abnormal and to go to ER to be admitted for poss AICD and angiogram. She has had some mild SOB recently but no palpitations or CP. Has generally been asymptomatic, presents at suggestion of her cardiologist. She had the event monitor placed after her ablation in December as part of her follow up for the ablation.    no PMD, uses Dr Espinoza. EP doctor was Dr Vargas (ablation done [facility])    Levothyroxine .075mg qD, metoprolol 25mg TID, furosemide 20mg daily, Breo ellipta 100-25mcg oral inhaler qD, ipratrop/albuterol as needed, simvastatin 20mg, monteleukast 10mg daily, alprazolam .25mg prn, losartan daily, eliquis 5mg BID

## 2021-03-08 NOTE — ED PROVIDER NOTE - INTERPRETATION
EKG reviewed for rate, rhythm, axis, intervals and segments, including QRS morphology, P wave appearance T wave appearance, KS interval, and QT interval.  I find the EKG to be unremarkable in all of these regards except as follows: PVC

## 2021-03-08 NOTE — ED PROVIDER NOTE - RAPID ASSESSMENT
54y F PMHx A-fib s/p ablation 2020 presents to ED for Holter monitor showing Vtach sent in by cardiologist Dr. Yousif for CTA. Denies pain. Endorses SOB.    Gabo OBREGON (Scribe), have documented this rapid assessment note under the dictation of Dr. Nas Quiroz, which has been reviewed and affirmed to be accurate.  Patient was seen as a tele QDOC patient. The patient will be seen and further worked up in the main emergency department and their care will be completed by the main emergency department team along with a thorough physical exam. Receiving team will follow up on labs, analgesia, any clinical imaging, reassess and disposition as clinically indicated, all decisions regarding the progression of care will be made at their discretion.    Scribe Statement: Gabo OBREGON, attest that this documentation has been prepared under the direction and in the presence of Doctor Nas Quiroz)

## 2021-03-08 NOTE — ED ADULT NURSE NOTE - NSIMPLEMENTINTERV_GEN_ALL_ED
Implemented All Universal Safety Interventions:  [place] to call system. Call bell, personal items and telephone within reach. Instruct patient to call for assistance. Room bathroom lighting operational. Non-slip footwear when patient is off stretcher. Physically safe environment: no spills, clutter or unnecessary equipment. Stretcher in lowest position, wheels locked, appropriate side rails in place.

## 2021-03-08 NOTE — H&P ADULT - ASSESSMENT
71 F hx of COPD, hypothyroidism, HTN, AF s/p ablation in Dec 2020, on eliquis, MV repair in 2014, cardioversion in Sept 2020 b/c of AFib, had an echo today withn ef 25 % with LVE and lae with mod MR, who recently was seen by Dr Sam evangelista a holter monitor who was advise to have AICD was seen in the office with increasing palpitaion and holter with multiple episodes of VT,and was sent to er.  pt had echo before with ef of 35 % and underwent ablation for A.fib 3 months ago with no sig change in EF. pt is been treated with beta blocker and losartan for heart failure for many years since the MV repair.  pt with hx of non ischemic cardiomyopathy with s/p a.fib ablation with still severe cardiomyopathy/MR /chf with VT  tele  hold eliquis  R/L heart cath  continue all cardiac meds  will consider possible AICD implant

## 2021-03-08 NOTE — ED ADULT NURSE NOTE - OBJECTIVE STATEMENT
pt 72 y/o female, AxOx3, presents to ED from cardiologist office for admission for angiogram/ AICD placement. PMH of COPD, afib w/ ablation sept 2020 on eliquis. Pt had ablation done at [facility], followed up with cardiologist and had runs of V-tach on mobile tele monitoring 2/15- 3/1. Seen in cardiologist office for echo and EKG- prompting visit to ED for admission. Pt denies throughout monitoring, endorses intermittent CORMIER- comes intermittently, days  episodes. Pt is well appearing, speaking full sentences without difficulty. Breathing spontaneous and unlabored with pulse ox >95% on room air. Upon assessment, abdomen soft and nontender, +strong peripheral pulses, moving all extremities without difficulty, lungs clear. Pt placed on continuous pulse ox and cardiac monitor, NSR noted. Pt denies CP, SOB, HA, vision changes, n/v/d, fevers chills, abdominal pain. Safety and comfort measures initiated- bed placed in lowest position and side rails raised. Pt oriented to call bell system.

## 2021-03-08 NOTE — ED PROVIDER NOTE - ATTENDING CONTRIBUTION TO CARE
70 yo female PMH as noted with intermittent CORMIER and abnormal echo noted at cardiologist's office today.  asymptomatic on my assessment, EKG without STEMI.  will check labs, d/w cards and likely admit for further management.

## 2021-03-08 NOTE — ED ADULT NURSE REASSESSMENT NOTE - NS ED NURSE REASSESS COMMENT FT1
Pt AxOx3 observed sitting up in stretcher conversing with RN without difficulty. Breathing spontaneous and unlabored with pulse ox >95% on room air. Pt updated on plan of care awaiting bed assignment. RTM tele.  Denies CP, SOB or vision changes. No acute distress noted. Call bell within reach.

## 2021-03-08 NOTE — H&P ADULT - NSICDXFAMILYHX_GEN_ALL_CORE_FT
FAMILY HISTORY:  Family history of lung cancer    Father  Still living? Unknown  Family history of CHF (congestive heart failure), Age at diagnosis: Age Unknown    Mother  Still living? Unknown  Family history of CHF (congestive heart failure), Age at diagnosis: Age Unknown

## 2021-03-08 NOTE — H&P ADULT - NSICDXPASTSURGICALHX_GEN_ALL_CORE_FT
PAST SURGICAL HISTORY:   delivery NOS     History of varicose vein stripping     S/P mitral valve repair

## 2021-03-08 NOTE — ED ADULT NURSE NOTE - FAMILY HISTORY
Family history of lung cancer     Father  Still living? Unknown  Family history of CHF (congestive heart failure), Age at diagnosis: Age Unknown     Mother  Still living? Unknown  Family history of CHF (congestive heart failure), Age at diagnosis: Age Unknown

## 2021-03-09 LAB — TSH SERPL-MCNC: 3.2 UIU/ML — SIGNIFICANT CHANGE UP (ref 0.27–4.2)

## 2021-03-09 PROCEDURE — 93456 R HRT CORONARY ARTERY ANGIO: CPT | Mod: 26

## 2021-03-09 PROCEDURE — 99152 MOD SED SAME PHYS/QHP 5/>YRS: CPT

## 2021-03-09 RX ORDER — ENOXAPARIN SODIUM 100 MG/ML
70 INJECTION SUBCUTANEOUS ONCE
Refills: 0 | Status: COMPLETED | OUTPATIENT
Start: 2021-03-09 | End: 2021-03-09

## 2021-03-09 RX ORDER — POTASSIUM CHLORIDE 20 MEQ
40 PACKET (EA) ORAL ONCE
Refills: 0 | Status: COMPLETED | OUTPATIENT
Start: 2021-03-09 | End: 2021-03-09

## 2021-03-09 RX ADMIN — ENOXAPARIN SODIUM 70 MILLIGRAM(S): 100 INJECTION SUBCUTANEOUS at 16:14

## 2021-03-09 RX ADMIN — SIMVASTATIN 20 MILLIGRAM(S): 20 TABLET, FILM COATED ORAL at 21:07

## 2021-03-09 RX ADMIN — Medication 75 MICROGRAM(S): at 06:02

## 2021-03-09 RX ADMIN — Medication 40 MILLIEQUIVALENT(S): at 21:08

## 2021-03-09 RX ADMIN — Medication 10 MILLIEQUIVALENT(S): at 14:38

## 2021-03-09 RX ADMIN — MONTELUKAST 10 MILLIGRAM(S): 4 TABLET, CHEWABLE ORAL at 21:07

## 2021-03-09 RX ADMIN — LOSARTAN POTASSIUM 25 MILLIGRAM(S): 100 TABLET, FILM COATED ORAL at 06:01

## 2021-03-09 RX ADMIN — Medication 20 MILLIGRAM(S): at 06:02

## 2021-03-09 NOTE — CONSULT NOTE ADULT - SUBJECTIVE AND OBJECTIVE BOX
---___---___---___---___---___---___ ---___---___---___---___---___---___---___---___---                  M E D I C A L   A T T E N D I N G    C O N S U L T A T I O N   N O T E  ---___---___---___---___---___---___ ---___---___---___---___---___---___---___---___---       - Patient seen and examined by me earlier today.   ++CHIEF COMPLAINT:   Patient is a 71y old  Female who presented with VT/chf (09 Mar 2021 09:06)    ++  HPI:  Pt is a 72 y/o woman with PMH of COPD, hypothyroidism, HTN, AF s/p ablation in Dec 2020, on eliquis, MV repair in , cardioversion in 2020 b/c of AFib, had an echo today withn ef 25 % with LVE and lae with mod MR, who was recently advise to have AICD placment based on a holter monitor showing multiple episodes of VT,and was sent to er.  pt had echo before with ef of 35 % and underwent ablation for A.fib 3 months ago with no sig change in EF. pt is been treated with beta blocker and losartan for heart failure for many years since the MV repair.    ---___---___---___---___---___---  PAST MEDICAL & SURGICAL HISTORY:  Osteoporosis  Hypothyroid  Syncope  Cardiac arrhythmia  Varicose vein  HTN (hypertension)  Mitral regurgitation  Mitral valve prolapse  S/P mitral valve repair  History of varicose vein stripping   delivery NOS    ---___---___---___---___---___---   FAMILY HISTORY:  Family history of lung cancer  Family history of CHF (congestive heart failure) (Father, Mother)    --___---___---___---___---___---  SOCIAL HISTORY:  Alcohol: None reported  Smoking: None reported    ---___---___---___---___---___---  ALLERGIES:     No Known Allergies    ---___---___---___---___---___---  MEDICATIONS:  MEDICATIONS  (STANDING):  buDESOnide    Inhalation Suspension 0.25 milliGRAM(s) Inhalation two times a day  furosemide    Tablet 20 milliGRAM(s) Oral daily  levothyroxine 75 MICROGram(s) Oral daily  losartan 25 milliGRAM(s) Oral daily  montelukast 10 milliGRAM(s) Oral at bedtime  potassium chloride    Tablet ER 10 milliEquivalent(s) Oral daily  simvastatin 20 milliGRAM(s) Oral at bedtime    MEDICATIONS  (PRN):  ALPRAZolam 0.25 milliGRAM(s) Oral two times a day PRN anxiety    ___________  Active diet:  Diet, NPO after Midnight:      NPO Start Date: 09-Mar-2021,   NPO Start Time: 23:59  Diet, DASH/TLC:   Sodium & Cholesterol Restricted  ___________________    HOME MEDICATIONS:  Artificial Tears ophthalmic solution  Breo Ellipta 100 mcg-25 mcg/inh inhalation powder  Eliquis 5 mg oral tablet  furosemide 20 mg oral tablet  levothyroxine 75 mcg (0.075 mg) oral tablet  losartan 25 mg oral tablet  Metamucil 3.4 g/5.2 g oral powder for reconstitution  montelukast 10 mg oral tablet  potassium chloride 10 mEq oral tablet, extended release  Saline Mist 0.65% nasal spray (obsolete)  simvastatin 20 mg oral tablet  Tylenol 500 mg oral tablet  Vitamin C 500 mg oral tablet  Vitamin D3 2000 intl units (50 mcg) oral tablet    ---___---___---___---___---___---  REVIEW OF SYSTEM:    GEN: no fever, no chills, no pain  RESP: no SOB, no cough, no sputum  CVS: no chest pain, no palpitations, no edema  GI: no abdominal pain, no nausea, no vomiting, no constipation, no diarrhea  : no dysuria, no frequency, no hematuria  Neuro: no headache, no dizziness  PSYCH: no anxiety, no depression  Derm : no itching, no rash    ---___---___---___---___---___---    VITAL SIGNS:  T(C): 36.5 (21 @ 10:05), Max: 37 (21 @ 05:39)  HR: 87 (21 @ 13:35) (64 - 95)  BP: 94/55 (21 @ 13:35) (93/76 - 123/80)  BP(mean): 74 (21 @ 22:51)  RR: 17 (21 @ 13:35) (15 - 18)  SpO2: 97% (21 @ 13:35) (93% - 100%)     I&O's Summary    09 Mar 2021 07:01  -  09 Mar 2021 19:21  --------------------------------------------------------  IN: 240 mL / OUT: 0 mL / NET: 240 mL    ---___---___---___---___---___---  PHYSICAL EXAM:    GEN: A&O X 3 , NAD , comfortable, pleasant, resting in bed post Cath   HEENT: NCAT, PERRL, MMM, hearing intact  Neck: supple , no JVD  CVS: S1S2 , regular , No M/R/G appreciated  PULM: CTA B/L,  no W/R/R appreciated  ABD.: soft. non tender, non distended,  bowel sounds present  Extrem: intact pulses , no edema   Derm: No rash , no ecchymoses  PSYCH : normal mood,  no delusion not anxious     ---___---___---___---___---___---            LAB AND IMAGIN.2   10. )-----------( 280      ( 08 Mar 2021 18:25 )             43.8            138  |  100  |  16  ----------------------------<  96  3.8   |  24  |  0.73    Ca    10.4      08 Mar 2021 18:25    TPro  7.8  /  Alb  4.4  /  TBili  0.4  /  DBili  x   /  AST  15  /  ALT  13  /  AlkPhos  72  03-08    PT/INR - ( 08 Mar 2021 18:25 )   PT: 16.4 sec;   INR: 1.39 ratio    PTT - ( 08 Mar 2021 18:25 )  PTT:39.6 sec               TSH:      3.20   (21)             < from: Cardiac Cath Lab - Adult (21 @ 09:31) >  CORONARY VESSELS: The coronary circulation is right dominant.  LM:   --  LM: Normal.  LAD:   --  LAD: Normal.  CX:   --  Circumflex: Normal.  RCA:   --  RCA: Angiography showed minor luminal irregularities with no  flow limiting lesions.  COMPLICATIONS: There were no complications.  DIAGNOSTIC RECOMMENDATIONS: 1. No evidence of obstructive CAD  INTERVENTIONAL RECOMMENDATIONS: 1. No evidence of obstructive CAD  Prepared and signed by  Aneesh Richey M.D.  < end of copied text >    PREVIOUS DIAGNOSTIC TESTING:      < from: Transesophageal Echocardiogram (20 @ 08:22) >  1. An annuloplasty ring is seen in the mitral position.  Moderate mitral regurgitation. Peak mitral valve gradient  equals 9 mm Hg, mean transmitral valve gradient equals 4 mm  Hg, which is probably normal in the setting of a An  annuloplasty ring is seen in the mitral position..  2. Severely dilated left atrium.  LA volume index = 63  cc/m2.   Spontaneous echo contrast seen.   No left atrial  or left atrial appendage thrombus.   Decreased left atrial  appendage velocities noted.  3. Moderate left ventricular enlargement.  4. Moderate global left ventricular systolic dysfunction.  Septal motion consistent with cardiac surgery.  5. Moderate right atrial enlargement.  6. Right ventricular enlargement with normal right  ventricular systolic function.  7. Normal tricuspid valve. Mild-moderate tricuspid regurgitation.  8. Estimated pulmonary artery systolic pressure equals 44  mm Hg, assuming right atrial pressure equals 8 mm Hg,  consistent with mild pulmonary pressures.  9. Agitated saline injection and color flow Doppler  demonstrates no evidence of a patent foramen ovale.     ---___---___---___---___---___---___ ---___---___---___---___---                         A S S E S S M E N T   A N D   P L A N :      VT  chronic systolic CHF  Osteoporosis  Hypothyroidism  HTN (hypertension)  Mitral regurgitation post MVR    ===>>    pt evaluated just post cath today  pt doing well no interventions, mild CAD  pt is planned for AICD tomorrow  monitor vitals, labs closely   tele  keep K~4, Mag ~2  Continue Current medications otherwise and monitor.  GI/DVT Prophylaxis per protocol.    --------------------------------------------  Case discussed with pt, cardio   Education given on findings and plan of care.  ___________________________  Will follow with you.   Thank you,  YVROSE Messina D.O.  Pager: [phone]   Date of service : 21

## 2021-03-10 ENCOUNTER — TRANSCRIPTION ENCOUNTER (OUTPATIENT)
Age: 71
End: 2021-03-10

## 2021-03-10 LAB
ANION GAP SERPL CALC-SCNC: 11 MMOL/L — SIGNIFICANT CHANGE UP (ref 5–17)
APTT BLD: 42.7 SEC — HIGH (ref 27.5–35.5)
BASOPHILS # BLD AUTO: 0.03 K/UL — SIGNIFICANT CHANGE UP (ref 0–0.2)
BASOPHILS NFR BLD AUTO: 0.3 % — SIGNIFICANT CHANGE UP (ref 0–2)
BUN SERPL-MCNC: 24 MG/DL — HIGH (ref 7–23)
CALCIUM SERPL-MCNC: 8.7 MG/DL — SIGNIFICANT CHANGE UP (ref 8.4–10.5)
CHLORIDE SERPL-SCNC: 102 MMOL/L — SIGNIFICANT CHANGE UP (ref 96–108)
CO2 SERPL-SCNC: 24 MMOL/L — SIGNIFICANT CHANGE UP (ref 22–31)
CREAT SERPL-MCNC: 0.66 MG/DL — SIGNIFICANT CHANGE UP (ref 0.5–1.3)
EOSINOPHIL # BLD AUTO: 0.18 K/UL — SIGNIFICANT CHANGE UP (ref 0–0.5)
EOSINOPHIL NFR BLD AUTO: 2 % — SIGNIFICANT CHANGE UP (ref 0–6)
GLUCOSE SERPL-MCNC: 94 MG/DL — SIGNIFICANT CHANGE UP (ref 70–99)
HCT VFR BLD CALC: 38.5 % — SIGNIFICANT CHANGE UP (ref 34.5–45)
HGB BLD-MCNC: 12.4 G/DL — SIGNIFICANT CHANGE UP (ref 11.5–15.5)
IMM GRANULOCYTES NFR BLD AUTO: 0.3 % — SIGNIFICANT CHANGE UP (ref 0–1.5)
INR BLD: 1.08 RATIO — SIGNIFICANT CHANGE UP (ref 0.88–1.16)
LYMPHOCYTES # BLD AUTO: 3.08 K/UL — SIGNIFICANT CHANGE UP (ref 1–3.3)
LYMPHOCYTES # BLD AUTO: 33.8 % — SIGNIFICANT CHANGE UP (ref 13–44)
MAGNESIUM SERPL-MCNC: 1.9 MG/DL — SIGNIFICANT CHANGE UP (ref 1.6–2.6)
MCHC RBC-ENTMCNC: 29.6 PG — SIGNIFICANT CHANGE UP (ref 27–34)
MCHC RBC-ENTMCNC: 32.2 GM/DL — SIGNIFICANT CHANGE UP (ref 32–36)
MCV RBC AUTO: 91.9 FL — SIGNIFICANT CHANGE UP (ref 80–100)
MONOCYTES # BLD AUTO: 0.9 K/UL — SIGNIFICANT CHANGE UP (ref 0–0.9)
MONOCYTES NFR BLD AUTO: 9.9 % — SIGNIFICANT CHANGE UP (ref 2–14)
NEUTROPHILS # BLD AUTO: 4.88 K/UL — SIGNIFICANT CHANGE UP (ref 1.8–7.4)
NEUTROPHILS NFR BLD AUTO: 53.7 % — SIGNIFICANT CHANGE UP (ref 43–77)
NRBC # BLD: 0 /100 WBCS — SIGNIFICANT CHANGE UP (ref 0–0)
PLATELET # BLD AUTO: 209 K/UL — SIGNIFICANT CHANGE UP (ref 150–400)
POTASSIUM SERPL-MCNC: 3.5 MMOL/L — SIGNIFICANT CHANGE UP (ref 3.5–5.3)
POTASSIUM SERPL-SCNC: 3.5 MMOL/L — SIGNIFICANT CHANGE UP (ref 3.5–5.3)
PROTHROM AB SERPL-ACNC: 12.9 SEC — SIGNIFICANT CHANGE UP (ref 10.6–13.6)
RBC # BLD: 4.19 M/UL — SIGNIFICANT CHANGE UP (ref 3.8–5.2)
RBC # FLD: 13.6 % — SIGNIFICANT CHANGE UP (ref 10.3–14.5)
SODIUM SERPL-SCNC: 137 MMOL/L — SIGNIFICANT CHANGE UP (ref 135–145)
WBC # BLD: 9.1 K/UL — SIGNIFICANT CHANGE UP (ref 3.8–10.5)
WBC # FLD AUTO: 9.1 K/UL — SIGNIFICANT CHANGE UP (ref 3.8–10.5)

## 2021-03-10 PROCEDURE — 71045 X-RAY EXAM CHEST 1 VIEW: CPT | Mod: 26

## 2021-03-10 PROCEDURE — 93010 ELECTROCARDIOGRAM REPORT: CPT

## 2021-03-10 RX ORDER — CEFAZOLIN SODIUM 1 G
2000 VIAL (EA) INJECTION EVERY 8 HOURS
Refills: 0 | Status: COMPLETED | OUTPATIENT
Start: 2021-03-10 | End: 2021-03-11

## 2021-03-10 RX ORDER — MAGNESIUM SULFATE 500 MG/ML
2 VIAL (ML) INJECTION ONCE
Refills: 0 | Status: COMPLETED | OUTPATIENT
Start: 2021-03-10 | End: 2021-03-10

## 2021-03-10 RX ORDER — POTASSIUM CHLORIDE 20 MEQ
40 PACKET (EA) ORAL ONCE
Refills: 0 | Status: COMPLETED | OUTPATIENT
Start: 2021-03-10 | End: 2021-03-10

## 2021-03-10 RX ORDER — CEFAZOLIN SODIUM 1 G
2000 VIAL (EA) INJECTION EVERY 8 HOURS
Refills: 0 | Status: DISCONTINUED | OUTPATIENT
Start: 2021-03-10 | End: 2021-03-10

## 2021-03-10 RX ORDER — ACETAMINOPHEN 500 MG
650 TABLET ORAL EVERY 6 HOURS
Refills: 0 | Status: DISCONTINUED | OUTPATIENT
Start: 2021-03-10 | End: 2021-03-11

## 2021-03-10 RX ORDER — METOPROLOL TARTRATE 50 MG
50 TABLET ORAL DAILY
Refills: 0 | Status: DISCONTINUED | OUTPATIENT
Start: 2021-03-10 | End: 2021-03-11

## 2021-03-10 RX ADMIN — Medication 650 MILLIGRAM(S): at 11:35

## 2021-03-10 RX ADMIN — Medication 50 GRAM(S): at 05:48

## 2021-03-10 RX ADMIN — LOSARTAN POTASSIUM 25 MILLIGRAM(S): 100 TABLET, FILM COATED ORAL at 05:48

## 2021-03-10 RX ADMIN — SIMVASTATIN 20 MILLIGRAM(S): 20 TABLET, FILM COATED ORAL at 21:09

## 2021-03-10 RX ADMIN — Medication 650 MILLIGRAM(S): at 12:05

## 2021-03-10 RX ADMIN — Medication 75 MICROGRAM(S): at 05:48

## 2021-03-10 RX ADMIN — Medication 10 MILLIEQUIVALENT(S): at 11:26

## 2021-03-10 RX ADMIN — Medication 100 MILLIGRAM(S): at 16:16

## 2021-03-10 RX ADMIN — Medication 20 MILLIGRAM(S): at 05:48

## 2021-03-10 RX ADMIN — MONTELUKAST 10 MILLIGRAM(S): 4 TABLET, CHEWABLE ORAL at 21:09

## 2021-03-10 RX ADMIN — Medication 50 MILLIGRAM(S): at 11:26

## 2021-03-10 RX ADMIN — Medication 650 MILLIGRAM(S): at 18:09

## 2021-03-10 RX ADMIN — Medication 40 MILLIEQUIVALENT(S): at 11:24

## 2021-03-10 RX ADMIN — Medication 650 MILLIGRAM(S): at 19:00

## 2021-03-10 NOTE — PROGRESS NOTE ADULT - ASSESSMENT
( Note written / Date of service 03-10-21 )    ==================>> MEDICATIONS <<====================    buDESOnide    Inhalation Suspension 0.25 milliGRAM(s) Inhalation two times a day  ceFAZolin   IVPB 2000 milliGRAM(s) IV Intermittent every 8 hours  furosemide    Tablet 20 milliGRAM(s) Oral daily  levothyroxine 75 MICROGram(s) Oral daily  losartan 25 milliGRAM(s) Oral daily  metoprolol succinate ER 50 milliGRAM(s) Oral daily  montelukast 10 milliGRAM(s) Oral at bedtime  potassium chloride    Tablet ER 10 milliEquivalent(s) Oral daily  simvastatin 20 milliGRAM(s) Oral at bedtime    MEDICATIONS  (PRN):  acetaminophen   Tablet .. 650 milliGRAM(s) Oral every 6 hours PRN Moderate Pain (4 - 6)  ALPRAZolam 0.25 milliGRAM(s) Oral two times a day PRN anxiety    ___________  Active diet:  Diet, DASH/TLC:   Sodium & Cholesterol Restricted  ___________________    ==================>> VITAL SIGNS <<==================  Height (cm): 160  Weight (kg): 77.6  BMI (kg/m2): 30.3  Vital Signs Last 24 HrsT(C): 36.2 (03-10-21 @ 11:10)  T(F): 97.1 (03-10-21 @ 11:10), Max: 98.4 (03-10-21 @ 04:40)  HR: 74 (03-10-21 @ 17:10) (73 - 87)  BP: 105/66 (03-10-21 @ 17:10)  RR: 16 (03-10-21 @ 17:10) (16 - 21)  SpO2: 94% (03-10-21 @ 17:10) (92% - 98%)      CAPILLARY BLOOD GLUCOSE         ==================>> LAB AND IMAGING <<==================                        12.4   9.10  )-----------( 209      ( 10 Mar 2021 00:57 )             38.5        03-10    137  |  102  |  24<H>  ----------------------------<  94  3.5   |  24  |  0.66    Ca    8.7      10 Mar 2021 00:57  Mg     1.9     03-10    TPro  7.8  /  Alb  4.4  /  TBili  0.4  /  DBili  x   /  AST  15  /  ALT  13  /  AlkPhos  72  03-08    WBC count:   9.10 <<== ,  10.22 <<==   Hemoglobin:   12.4 <<==,  14.2 <<==  platelets:  209 <==, 280 <==    Creatinine:  0.66  <<==, 0.73  <<==  Sodium:   137  <==, 138  <==       AST:          15 <==      ALT:        13  <==      AP:        72  <=     Bili:        0.4  <=     _________________________________________________________________________________________  ========>>  M E D I C A L   A T T E N D I N G    F O L L O W  U P  N O T E  <<=========  -----------------------------------------------------------------------------------------------------    - Patient seen and examined by me earlier today. pt seen post AICD implant, doing well   - In summary,  EDGAR HAMILTON is a 71y year old woman admitted with VT   - Patient today overall doing ok, comfortable, eating OK.     ==================>> REVIEW OF SYSTEM <<=================    GEN: no fever, no chills, no significant pain reported   RESP: no SOB, no cough, no sputum  CVS: no chest pain, no palpitations, no edema  GI: no abdominal pain, no nausea, no constipation, no diarrhea  : no dysuria, no frequency, no hematuria  Neuro: no headache, no dizziness  Derm : no itching, no rash    ==================>> PHYSICAL EXAM <<=================    GEN: A&O X 3 , NAD , comfortable  HEENT: NCAT, PERRL, MMM, hearing intact  Neck: supple , no JVD appreciated  CVS: S1S2 , regular , No M/R/G appreciated  PULM: CTA B/L,  no W/R/R appreciated  ABD.: soft. non tender, non distended,  bowel sounds present  Extrem: intact pulses , no edema   PSYCH : normal mood,  not anxious                             ( Note written / Date of service 03-10-21 )    ==================>> MEDICATIONS <<====================    buDESOnide    Inhalation Suspension 0.25 milliGRAM(s) Inhalation two times a day  ceFAZolin   IVPB 2000 milliGRAM(s) IV Intermittent every 8 hours  furosemide    Tablet 20 milliGRAM(s) Oral daily  levothyroxine 75 MICROGram(s) Oral daily  losartan 25 milliGRAM(s) Oral daily  metoprolol succinate ER 50 milliGRAM(s) Oral daily  montelukast 10 milliGRAM(s) Oral at bedtime  potassium chloride    Tablet ER 10 milliEquivalent(s) Oral daily  simvastatin 20 milliGRAM(s) Oral at bedtime    MEDICATIONS  (PRN):  acetaminophen   Tablet .. 650 milliGRAM(s) Oral every 6 hours PRN Moderate Pain (4 - 6)  ALPRAZolam 0.25 milliGRAM(s) Oral two times a day PRN anxiety    ___________  Active diet:  Diet, DASH/TLC:   Sodium & Cholesterol Restricted  ___________________    ==================>> VITAL SIGNS <<==================    Height (cm): 160  Weight (kg): 77.6  BMI (kg/m2): 30.3  Vital Signs Last 24 HrsT(C): 36.2 (03-10-21 @ 11:10)  T(F): 97.1 (03-10-21 @ 11:10), Max: 98.4 (03-10-21 @ 04:40)  HR: 74 (03-10-21 @ 17:10) (73 - 87)  BP: 105/66 (03-10-21 @ 17:10)  RR: 16 (03-10-21 @ 17:10) (16 - 21)  SpO2: 94% (03-10-21 @ 17:10) (92% - 98%)       ==================>> LAB AND IMAGING <<==================                        12.4   9.10  )-----------( 209      ( 10 Mar 2021 00:57 )             38.5        03-10    137  |  102  |  24<H>  ----------------------------<  94  3.5   |  24  |  0.66    Ca    8.7      10 Mar 2021 00:57  Mg     1.9     03-10    TPro  7.8  /  Alb  4.4  /  TBili  0.4  /  DBili  x   /  AST  15  /  ALT  13  /  AlkPhos  72  03-08    WBC count:   9.10 <<== ,  10.22 <<==   Hemoglobin:   12.4 <<==,  14.2 <<==  platelets:  209 <==, 280 <==    Creatinine:  0.66  <<==, 0.73  <<==  Sodium:   137  <==, 138  <==    ___________________________________________________________________________________  ===============>>  A S S E S S M E N T   A N D   P L A N <<===============  ------------------------------------------------------------------------------------------    VT  chronic systolic CHF  nonischemic cardiomyopathy  mild CAD   Osteoporosis  Hypothyroidism  HTN (hypertension)  Mitral regurgitation post MVR    ===>>    pt evaluated just post AICD today: doing well   monitor vitals, labs closely   tele  keep K~4, Mag ~2  Continue Current medications otherwise and monitor.  GI/DVT Prophylaxis per protocol.  possible DC home in AM if stable   --------------------------------------------  Case discussed with pt, cardio   Education given on findings and plan of care.  ___________________________  Will follow with you.   Thank you,  YVROSE Messina D.O.  Pager: [phone]

## 2021-03-11 ENCOUNTER — TRANSCRIPTION ENCOUNTER (OUTPATIENT)
Age: 71
End: 2021-03-11

## 2021-03-11 VITALS
HEART RATE: 75 BPM | DIASTOLIC BLOOD PRESSURE: 66 MMHG | OXYGEN SATURATION: 94 % | SYSTOLIC BLOOD PRESSURE: 101 MMHG | TEMPERATURE: 98 F | RESPIRATION RATE: 16 BRPM

## 2021-03-11 LAB
ANION GAP SERPL CALC-SCNC: 10 MMOL/L — SIGNIFICANT CHANGE UP (ref 5–17)
BASOPHILS # BLD AUTO: 0.03 K/UL — SIGNIFICANT CHANGE UP (ref 0–0.2)
BASOPHILS NFR BLD AUTO: 0.3 % — SIGNIFICANT CHANGE UP (ref 0–2)
BUN SERPL-MCNC: 19 MG/DL — SIGNIFICANT CHANGE UP (ref 7–23)
CALCIUM SERPL-MCNC: 8.8 MG/DL — SIGNIFICANT CHANGE UP (ref 8.4–10.5)
CHLORIDE SERPL-SCNC: 99 MMOL/L — SIGNIFICANT CHANGE UP (ref 96–108)
CO2 SERPL-SCNC: 24 MMOL/L — SIGNIFICANT CHANGE UP (ref 22–31)
CREAT SERPL-MCNC: 0.51 MG/DL — SIGNIFICANT CHANGE UP (ref 0.5–1.3)
EOSINOPHIL # BLD AUTO: 0.2 K/UL — SIGNIFICANT CHANGE UP (ref 0–0.5)
EOSINOPHIL NFR BLD AUTO: 2.2 % — SIGNIFICANT CHANGE UP (ref 0–6)
GLUCOSE SERPL-MCNC: 95 MG/DL — SIGNIFICANT CHANGE UP (ref 70–99)
HCT VFR BLD CALC: 38.1 % — SIGNIFICANT CHANGE UP (ref 34.5–45)
HGB BLD-MCNC: 12.3 G/DL — SIGNIFICANT CHANGE UP (ref 11.5–15.5)
IMM GRANULOCYTES NFR BLD AUTO: 0.3 % — SIGNIFICANT CHANGE UP (ref 0–1.5)
LYMPHOCYTES # BLD AUTO: 2.17 K/UL — SIGNIFICANT CHANGE UP (ref 1–3.3)
LYMPHOCYTES # BLD AUTO: 24.4 % — SIGNIFICANT CHANGE UP (ref 13–44)
MCHC RBC-ENTMCNC: 30 PG — SIGNIFICANT CHANGE UP (ref 27–34)
MCHC RBC-ENTMCNC: 32.3 GM/DL — SIGNIFICANT CHANGE UP (ref 32–36)
MCV RBC AUTO: 92.9 FL — SIGNIFICANT CHANGE UP (ref 80–100)
MONOCYTES # BLD AUTO: 0.96 K/UL — HIGH (ref 0–0.9)
MONOCYTES NFR BLD AUTO: 10.8 % — SIGNIFICANT CHANGE UP (ref 2–14)
NEUTROPHILS # BLD AUTO: 5.5 K/UL — SIGNIFICANT CHANGE UP (ref 1.8–7.4)
NEUTROPHILS NFR BLD AUTO: 62 % — SIGNIFICANT CHANGE UP (ref 43–77)
NRBC # BLD: 0 /100 WBCS — SIGNIFICANT CHANGE UP (ref 0–0)
PLATELET # BLD AUTO: 209 K/UL — SIGNIFICANT CHANGE UP (ref 150–400)
POTASSIUM SERPL-MCNC: 4 MMOL/L — SIGNIFICANT CHANGE UP (ref 3.5–5.3)
POTASSIUM SERPL-SCNC: 4 MMOL/L — SIGNIFICANT CHANGE UP (ref 3.5–5.3)
RBC # BLD: 4.1 M/UL — SIGNIFICANT CHANGE UP (ref 3.8–5.2)
RBC # FLD: 13.7 % — SIGNIFICANT CHANGE UP (ref 10.3–14.5)
SODIUM SERPL-SCNC: 133 MMOL/L — LOW (ref 135–145)
WBC # BLD: 8.89 K/UL — SIGNIFICANT CHANGE UP (ref 3.8–10.5)
WBC # FLD AUTO: 8.89 K/UL — SIGNIFICANT CHANGE UP (ref 3.8–10.5)

## 2021-03-11 PROCEDURE — 83735 ASSAY OF MAGNESIUM: CPT

## 2021-03-11 PROCEDURE — U0003: CPT

## 2021-03-11 PROCEDURE — 85610 PROTHROMBIN TIME: CPT

## 2021-03-11 PROCEDURE — 93456 R HRT CORONARY ARTERY ANGIO: CPT

## 2021-03-11 PROCEDURE — 84484 ASSAY OF TROPONIN QUANT: CPT

## 2021-03-11 PROCEDURE — U0005: CPT

## 2021-03-11 PROCEDURE — C1898: CPT

## 2021-03-11 PROCEDURE — C1769: CPT

## 2021-03-11 PROCEDURE — C1892: CPT

## 2021-03-11 PROCEDURE — 99152 MOD SED SAME PHYS/QHP 5/>YRS: CPT

## 2021-03-11 PROCEDURE — 93010 ELECTROCARDIOGRAM REPORT: CPT

## 2021-03-11 PROCEDURE — 83880 ASSAY OF NATRIURETIC PEPTIDE: CPT

## 2021-03-11 PROCEDURE — 33249 INSJ/RPLCMT DEFIB W/LEAD(S): CPT

## 2021-03-11 PROCEDURE — 86850 RBC ANTIBODY SCREEN: CPT

## 2021-03-11 PROCEDURE — C1721: CPT

## 2021-03-11 PROCEDURE — 85730 THROMBOPLASTIN TIME PARTIAL: CPT

## 2021-03-11 PROCEDURE — 99285 EMERGENCY DEPT VISIT HI MDM: CPT | Mod: 25

## 2021-03-11 PROCEDURE — 86900 BLOOD TYPING SEROLOGIC ABO: CPT

## 2021-03-11 PROCEDURE — 84443 ASSAY THYROID STIM HORMONE: CPT

## 2021-03-11 PROCEDURE — 93005 ELECTROCARDIOGRAM TRACING: CPT

## 2021-03-11 PROCEDURE — 86901 BLOOD TYPING SEROLOGIC RH(D): CPT

## 2021-03-11 PROCEDURE — C1887: CPT

## 2021-03-11 PROCEDURE — 71045 X-RAY EXAM CHEST 1 VIEW: CPT

## 2021-03-11 PROCEDURE — 85025 COMPLETE CBC W/AUTO DIFF WBC: CPT

## 2021-03-11 PROCEDURE — 80048 BASIC METABOLIC PNL TOTAL CA: CPT

## 2021-03-11 PROCEDURE — C1777: CPT

## 2021-03-11 PROCEDURE — 80053 COMPREHEN METABOLIC PANEL: CPT

## 2021-03-11 PROCEDURE — C1894: CPT

## 2021-03-11 RX ADMIN — Medication 650 MILLIGRAM(S): at 05:53

## 2021-03-11 RX ADMIN — Medication 100 MILLIGRAM(S): at 00:20

## 2021-03-11 RX ADMIN — Medication 50 MILLIGRAM(S): at 05:54

## 2021-03-11 RX ADMIN — Medication 20 MILLIGRAM(S): at 05:54

## 2021-03-11 RX ADMIN — Medication 650 MILLIGRAM(S): at 06:20

## 2021-03-11 RX ADMIN — Medication 10 MILLIEQUIVALENT(S): at 10:21

## 2021-03-11 RX ADMIN — Medication 650 MILLIGRAM(S): at 00:50

## 2021-03-11 RX ADMIN — LOSARTAN POTASSIUM 25 MILLIGRAM(S): 100 TABLET, FILM COATED ORAL at 05:54

## 2021-03-11 RX ADMIN — Medication 75 MICROGRAM(S): at 05:53

## 2021-03-11 RX ADMIN — Medication 650 MILLIGRAM(S): at 00:24

## 2021-03-11 NOTE — DISCHARGE NOTE PROVIDER - CARE PROVIDER_API CALL
Kasandra Espinoza  CARDIOVASCULAR DISEASE  [address], [address]  [place] [address]  Phone: [phone]  Fax: [phone]  Follow Up Time:

## 2021-03-11 NOTE — DISCHARGE NOTE PROVIDER - NSDCMRMEDTOKEN_GEN_ALL_CORE_FT
Artificial Tears ophthalmic solution: 1 drop(s) in each eye once a day, As Needed  Breo Ellipta 100 mcg-25 mcg/inh inhalation powder: 1 puff(s) inhaled once a day  Eliquis 5 mg oral tablet: 1 tab(s) orally 2 times a day  furosemide 20 mg oral tablet: 1 tab(s) orally once a day  levothyroxine 75 mcg (0.075 mg) oral tablet: 1 tab(s) orally once a day  losartan 25 mg oral tablet: 1 tab(s) orally once a day  Metamucil 3.4 g/5.2 g oral powder for reconstitution: 1 packet(s) orally once a day, As Needed  metoprolol tartrate 25 mg oral tablet: 1 tab(s) orally 3 times a day  montelukast 10 mg oral tablet: 1 tab(s) orally once a day (at bedtime)  potassium chloride 10 mEq oral tablet, extended release: 1 tab(s) orally once a day  Saline Mist 0.65% nasal spray (obsolete): 2 spray(s) nasal once a day, As Needed  simvastatin 20 mg oral tablet: 1 tab(s) orally once a day (at bedtime)  Tylenol 500 mg oral tablet: 2 tab(s) orally , As Needed  Vitamin C 500 mg oral tablet: 1 tab(s) orally once a day  Vitamin D3 2000 intl units (50 mcg) oral tablet: 1 tab(s) orally once a day

## 2021-03-11 NOTE — DISCHARGE NOTE NURSING/CASE MANAGEMENT/SOCIAL WORK - PATIENT PORTAL LINK FT
You can access the FollowMyHealth Patient Portal offered by [facility] by registering at the following website: [email or web address]. By joining [employer]’s FollowMyHealth portal, you will also be able to view your health information using other applications (apps) compatible with our system.

## 2021-03-11 NOTE — PROGRESS NOTE ADULT - SUBJECTIVE AND OBJECTIVE BOX
CARDIOLOGY     PROGRESS  NOTE   ________________________________________________    CHIEF COMPLAINT:Patient is a 71y old  Female who presents with a chief complaint of VT/chf (11 Mar 2021 02:30)  no complain/doing well.  	  REVIEW OF SYSTEMS:  CONSTITUTIONAL: No fever, weight loss, or fatigue  EYES: No eye pain, visual disturbances, or discharge  ENT:  No difficulty hearing, tinnitus, vertigo; No sinus or throat pain  NECK: No pain or stiffness  RESPIRATORY: No cough, wheezing, chills or hemoptysis; No Shortness of Breath  CARDIOVASCULAR: No chest pain, palpitations, passing out, dizziness, or leg swelling  GASTROINTESTINAL: No abdominal or epigastric pain. No nausea, vomiting, or hematemesis; No diarrhea or constipation. No melena or hematochezia.  GENITOURINARY: No dysuria, frequency, hematuria, or incontinence  NEUROLOGICAL: No headaches, memory loss, loss of strength, numbness, or tremors  SKIN: No itching, burning, rashes, or lesions   LYMPH Nodes: No enlarged glands  ENDOCRINE: No heat or cold intolerance; No hair loss  MUSCULOSKELETAL: No joint pain or swelling; No muscle, back, or extremity pain  PSYCHIATRIC: No depression, anxiety, mood swings, or difficulty sleeping  HEME/LYMPH: No easy bruising, or bleeding gums  ALLERGY AND IMMUNOLOGIC: No hives or eczema	    [ ] All others negative	  [x ] Unable to obtain    PHYSICAL EXAM:  T(C): 36.8 (03-11-21 @ 05:49), Max: 36.8 (03-11-21 @ 05:49)  HR: 74 (03-11-21 @ 05:49) (73 - 87)  BP: 102/79 (03-11-21 @ 05:49) (91/51 - 116/61)  RR: 15 (03-11-21 @ 05:49) (15 - 21)  SpO2: 95% (03-11-21 @ 05:49) (92% - 98%)  Wt(kg): --  I&O's Summary    11 Mar 2021 07:01  -  11 Mar 2021 08:57  --------------------------------------------------------  IN: 240 mL / OUT: 0 mL / NET: 240 mL        Appearance: Normal	  HEENT:   Normal oral mucosa, PERRL, EOMI	  Lymphatic: No lymphadenopathy  Cardiovascular: Normal S1 S2, No JVD, + murmurs, No edema  Respiratory: Lungs clear to auscultation	  Psychiatry: A & O x 3, Mood & affect appropriate  Gastrointestinal:  Soft, Non-tender, + BS	  Skin: No rashes, No ecchymoses, No cyanosis	  Neurologic: Non-focal  Extremities: Normal range of motion, No clubbing, cyanosis or edema  Vascular: Peripheral pulses palpable 2+ bilaterally    MEDICATIONS  (STANDING):  buDESOnide    Inhalation Suspension 0.25 milliGRAM(s) Inhalation two times a day  furosemide    Tablet 20 milliGRAM(s) Oral daily  levothyroxine 75 MICROGram(s) Oral daily  losartan 25 milliGRAM(s) Oral daily  metoprolol succinate ER 50 milliGRAM(s) Oral daily  montelukast 10 milliGRAM(s) Oral at bedtime  potassium chloride    Tablet ER 10 milliEquivalent(s) Oral daily  simvastatin 20 milliGRAM(s) Oral at bedtime      TELEMETRY: 	    ECG:  	  RADIOLOGY:  OTHER: 	  	  LABS:	 	    CARDIAC MARKERS:                                12.3   8.89  )-----------( 209      ( 11 Mar 2021 01:50 )             38.1     03-11    133<L>  |  99  |  19  ----------------------------<  95  4.0   |  24  |  0.51    Ca    8.8      11 Mar 2021 01:50  Mg     1.9     03-10      proBNP: Serum Pro-Brain Natriuretic Peptide: 874 pg/mL (03-08 @ 18:25)    Lipid Profile:   HgA1c:   TSH: Thyroid Stimulating Hormone, Serum: 3.20 uIU/mL (03-09 @ 02:16)    PT/INR - ( 10 Mar 2021 00:57 )   PT: 12.9 sec;   INR: 1.08 ratio         PTT - ( 10 Mar 2021 00:57 )  PTT:42.7 sec  < from: Xray Chest 1 View- PORTABLE-Urgent (03.10.21 @ 10:05) >  The heart is normal in size. The lungs appear to be clear. No pleural effusion. No pneumothorax. An ICD was placed on the left and the tip of the electrodes are in  the right atrium and right ventricular. No Pleural effusion. No pneumothorax.  < from: 12 Lead ECG (03.10.21 @ 09:46) >  Diagnosis Line NORMAL SINUS RHYTHM  INCOMPLETE RIGHT BUNDLE BRANCH BLOCK  NONSPECIFIC ST AND T WAVE ABNORMALITY  PROLONGED QT  ABNORMAL ECG  WHEN COMPARED WITH ECG OF 08-MAR-2021 17:41,  NO SIGNIFICANT CHANGE WAS FOUND          Assessment and plan  ---------------------------  71 F hx of COPD, hypothyroidism, HTN, AF s/p ablation in Dec 2020, on eliquis, MV repair in 2014, cardioversion in Sept 2020 b/c of AFib, had an echo today withn ef 25 % with LVE and lae with mod MR, who recently was seen by Dr Vargas whohad a holter monitor who was advise to have AICD was seen in the office with increasing palpitaion and holter with multiple episodes of VT,and was sent to er.  pt had echo before with ef of 35 % and underwent ablation for A.fib 3 months ago with no sig change in EF. pt is been treated with beta blocker and losartan for heart failure for many years since the MV repair.  pt with hx of non ischemic cardiomyopathy with s/p a.fib ablation with still severe cardiomyopathy/MR /chf with VT  tele  hold eliquis  cath results noted  pt with non ischemic cardiomyopathy/ chf /sss, bradycardia, NSVT with echo ef 25 % two days ago with  LVE on appropriate medical therapy  s/p dual chamber AICD  excellent parameters  pced at 68   lhr  increase beta blocker  dc home    	        
           CARDIOLOGY     PROGRESS  NOTE   ________________________________________________    CHIEF COMPLAINT:Patient is a 71y old  Female who presents with a chief complaint of VT/chf (09 Mar 2021 19:20)  no complain.  	  REVIEW OF SYSTEMS:  CONSTITUTIONAL: No fever, weight loss, or fatigue  EYES: No eye pain, visual disturbances, or discharge  ENT:  No difficulty hearing, tinnitus, vertigo; No sinus or throat pain  NECK: No pain or stiffness  RESPIRATORY: No cough, wheezing, chills or hemoptysis; + Shortness of Breath  CARDIOVASCULAR: No chest pain, palpitations, passing out, dizziness, or leg swelling  GASTROINTESTINAL: No abdominal or epigastric pain. No nausea, vomiting, or hematemesis; No diarrhea or constipation. No melena or hematochezia.  GENITOURINARY: No dysuria, frequency, hematuria, or incontinence  NEUROLOGICAL: No headaches, memory loss, loss of strength, numbness, or tremors  SKIN: No itching, burning, rashes, or lesions   LYMPH Nodes: No enlarged glands  ENDOCRINE: No heat or cold intolerance; No hair loss  MUSCULOSKELETAL: No joint pain or swelling; No muscle, back, or extremity pain  PSYCHIATRIC: No depression, anxiety, mood swings, or difficulty sleeping  HEME/LYMPH: No easy bruising, or bleeding gums  ALLERGY AND IMMUNOLOGIC: No hives or eczema	    [ ] All others negative	  [ ] Unable to obtain    PHYSICAL EXAM:  T(C): 36.9 (03-10-21 @ 08:01), Max: 36.9 (03-10-21 @ 04:40)  HR: 85 (03-10-21 @ 08:01) (85 - 95)  BP: 109/58 (03-10-21 @ 08:01) (93/76 - 122/75)  RR: 18 (03-10-21 @ 08:01) (16 - 18)  SpO2: 98% (03-10-21 @ 08:01) (93% - 100%)  Wt(kg): --  I&O's Summary    09 Mar 2021 07:01  -  10 Mar 2021 07:00  --------------------------------------------------------  IN: 240 mL / OUT: 0 mL / NET: 240 mL        Appearance: Normal	  HEENT:   Normal oral mucosa, PERRL, EOMI	  Lymphatic: No lymphadenopathy  Cardiovascular: Normal S1 S2, No JVD, + murmurs, No edema  Respiratory: Lungs clear to auscultation	  Psychiatry: A & O x 3, Mood & affect appropriate  Gastrointestinal:  Soft, Non-tender, + BS	  Skin: No rashes, No ecchymoses, No cyanosis	  Neurologic: Non-focal  Extremities: Normal range of motion, No clubbing, cyanosis or edema  Vascular: Peripheral pulses palpable 2+ bilaterally    MEDICATIONS  (STANDING):  buDESOnide    Inhalation Suspension 0.25 milliGRAM(s) Inhalation two times a day  furosemide    Tablet 20 milliGRAM(s) Oral daily  levothyroxine 75 MICROGram(s) Oral daily  losartan 25 milliGRAM(s) Oral daily  montelukast 10 milliGRAM(s) Oral at bedtime  potassium chloride    Tablet ER 10 milliEquivalent(s) Oral daily  potassium chloride    Tablet ER 40 milliEquivalent(s) Oral once  simvastatin 20 milliGRAM(s) Oral at bedtime      TELEMETRY: 	    ECG:  	  RADIOLOGY:  OTHER: 	  	  LABS:	 	    CARDIAC MARKERS:                                12.4   9.10  )-----------( 209      ( 10 Mar 2021 00:57 )             38.5     03-10    137  |  102  |  24<H>  ----------------------------<  94  3.5   |  24  |  0.66    Ca    8.7      10 Mar 2021 00:57  Mg     1.9     03-10    TPro  7.8  /  Alb  4.4  /  TBili  0.4  /  DBili  x   /  AST  15  /  ALT  13  /  AlkPhos  72  03-08    proBNP: Serum Pro-Brain Natriuretic Peptide: 874 pg/mL (03-08 @ 18:25)    Lipid Profile:   HgA1c:   TSH: Thyroid Stimulating Hormone, Serum: 3.20 uIU/mL (03-09 @ 02:16)    PT/INR - ( 10 Mar 2021 00:57 )   PT: 12.9 sec;   INR: 1.08 ratio         PTT - ( 10 Mar 2021 00:57 )  PTT:42.7 sec  < from: Cardiac Cath Lab - Adult (03.09.21 @ 09:31) >  LM:   --  LM: Normal.  LAD:   --  LAD: Normal.  CX:   --  Circumflex: Normal.  RCA:   --  RCA: Angiography showed minor luminal irregularities with no  flow limiting lesions.  COMPLICATIONS: There were no complications.  DIAGNOSTIC RECOMMENDATIONS: 1. No evidence of obstructive CAD  INTERVENTIONAL RECOMMENDATIONS: 1. No evidence of obstructive CAD  Prepared and signed by    < end of copied text >      Assessment and plan  ---------------------------  71 F hx of COPD, hypothyroidism, HTN, AF s/p ablation in Dec 2020, on eliquis, MV repair in 2014, cardioversion in Sept 2020 b/c of AFib, had an echo today withn ef 25 % with LVE and lae with mod MR, who recently was seen by Dr Vargas whohad a holter monitor who was advise to have AICD was seen in the office with increasing palpitaion and holter with multiple episodes of VT,and was sent to er.  pt had echo before with ef of 35 % and underwent ablation for A.fib 3 months ago with no sig change in EF. pt is been treated with beta blocker and losartan for heart failure for many years since the MV repair.  pt with hx of non ischemic cardiomyopathy with s/p a.fib ablation with still severe cardiomyopathy/MR /chf with VT  tele  hold eliquis  cath results noted  pt with non ischemic cardiomyopathy/ chf /sss, bradycardia, NSVT with echo ef 25 % two days ago with od LVE on appropriate medical therapy  discussed with pt agreed  pt was offered AICD at [facility]  plan  dual chamber AICD  pced at 68   lhr  increase beta blocker    	        
           CARDIOLOGY     PROGRESS  NOTE   ________________________________________________    CHIEF COMPLAINT:Patient is a 71y old  Female who presents with a chief complaint of VT/chf (08 Mar 2021 19:49)  no complain.  	  REVIEW OF SYSTEMS:  CONSTITUTIONAL: No fever, weight loss, or fatigue  EYES: No eye pain, visual disturbances, or discharge  ENT:  No difficulty hearing, tinnitus, vertigo; No sinus or throat pain  NECK: No pain or stiffness  RESPIRATORY: No cough, wheezing, chills or hemoptysis; + Shortness of Breath  CARDIOVASCULAR: No chest pain, palpitations, passing out, dizziness, or leg swelling  GASTROINTESTINAL: No abdominal or epigastric pain. No nausea, vomiting, or hematemesis; No diarrhea or constipation. No melena or hematochezia.  GENITOURINARY: No dysuria, frequency, hematuria, or incontinence  NEUROLOGICAL: No headaches, memory loss, loss of strength, numbness, or tremors  SKIN: No itching, burning, rashes, or lesions   LYMPH Nodes: No enlarged glands  ENDOCRINE: No heat or cold intolerance; No hair loss  MUSCULOSKELETAL: No joint pain or swelling; No muscle, back, or extremity pain  PSYCHIATRIC: No depression, anxiety, mood swings, or difficulty sleeping  HEME/LYMPH: No easy bruising, or bleeding gums  ALLERGY AND IMMUNOLOGIC: No hives or eczema	    [ ] All others negative	  [ ] Unable to obtain    PHYSICAL EXAM:  T(C): 37 (03-09-21 @ 05:39), Max: 37 (03-09-21 @ 05:39)  HR: 86 (03-09-21 @ 05:39) (64 - 86)  BP: 119/67 (03-09-21 @ 05:39) (27/89 - 127/89)  RR: 15 (03-09-21 @ 05:39) (15 - 18)  SpO2: 96% (03-09-21 @ 05:39) (93% - 100%)  Wt(kg): --  I&O's Summary      Appearance: Normal	  HEENT:   Normal oral mucosa, PERRL, EOMI	  Lymphatic: No lymphadenopathy  Cardiovascular: Normal S1 S2, No JVD, + murmurs, No edema  Respiratory: Lungs clear to auscultation	  Psychiatry: A & O x 3, Mood & affect appropriate  Gastrointestinal:  Soft, Non-tender, + BS	  Skin: No rashes, No ecchymoses, No cyanosis	  Neurologic: Non-focal  Extremities: Normal range of motion, No clubbing, cyanosis or edema  Vascular: Peripheral pulses palpable 2+ bilaterally    MEDICATIONS  (STANDING):  buDESOnide    Inhalation Suspension 0.25 milliGRAM(s) Inhalation two times a day  furosemide    Tablet 20 milliGRAM(s) Oral daily  levothyroxine 75 MICROGram(s) Oral daily  losartan 25 milliGRAM(s) Oral daily  montelukast 10 milliGRAM(s) Oral at bedtime  potassium chloride    Tablet ER 10 milliEquivalent(s) Oral daily  simvastatin 20 milliGRAM(s) Oral at bedtime      TELEMETRY: 	    ECG:  	  RADIOLOGY:  OTHER: 	  	  LABS:	 	    CARDIAC MARKERS:                                14.2   10.22 )-----------( 280      ( 08 Mar 2021 18:25 )             43.8     03-08    138  |  100  |  16  ----------------------------<  96  3.8   |  24  |  0.73    Ca    10.4      08 Mar 2021 18:25    TPro  7.8  /  Alb  4.4  /  TBili  0.4  /  DBili  x   /  AST  15  /  ALT  13  /  AlkPhos  72  03-08    proBNP: Serum Pro-Brain Natriuretic Peptide: 874 pg/mL (03-08 @ 18:25)    Lipid Profile:   HgA1c:   TSH: Thyroid Stimulating Hormone, Serum: 3.20 uIU/mL (03-09 @ 02:16)    PT/INR - ( 08 Mar 2021 18:25 )   PT: 16.4 sec;   INR: 1.39 ratio         PTT - ( 08 Mar 2021 18:25 )  PTT:39.6 sec      Assessment and plan  ---------------------------  71 F hx of COPD, hypothyroidism, HTN, AF s/p ablation in Dec 2020, on eliquis, MV repair in 2014, cardioversion in Sept 2020 b/c of AFib, had an echo today withn ef 25 % with LVE and lae with mod MR, who recently was seen by Dr Vargas whohad a holter monitor who was advise to have AICD was seen in the office with increasing palpitaion and holter with multiple episodes of VT,and was sent to er.  pt had echo before with ef of 35 % and underwent ablation for A.fib 3 months ago with no sig change in EF. pt is been treated with beta blocker and losartan for heart failure for many years since the MV repair.  pt with hx of non ischemic cardiomyopathy with s/p a.fib ablation with still severe cardiomyopathy/MR /chf with VT  tele  hold eliquis  R/L heart cath today  continue all cardiac meds  will consider possible AICD implant

## 2021-03-11 NOTE — DISCHARGE NOTE NURSING/CASE MANAGEMENT/SOCIAL WORK - NSDCFUADDAPPT_GEN_ALL_CORE_FT
Call Dr. Espinoza's office to set up a follow up appointment to assess your left chest wall site  3/17/21

## 2021-03-11 NOTE — DISCHARGE NOTE PROVIDER - NSDCFUADDAPPT_GEN_ALL_CORE_FT
Call Dr. Espinoza's office to set up a follow up appointment to assess your left chest wall site Call Dr. Espinoza's office to set up a follow up appointment to assess your left chest wall site  3/17/21

## 2021-03-11 NOTE — DISCHARGE NOTE PROVIDER - NSDCCPCAREPLAN_GEN_ALL_CORE_FT
PRINCIPAL DISCHARGE DIAGNOSIS  Diagnosis: Cardiac arrhythmia  Assessment and Plan of Treatment: Continue with follow-up visits to your electrophysiology team and with your home remote device monitoring (if applicable). Continue your medications as prescribed. Monitor your left chest site for swelling, bleeding.      SECONDARY DISCHARGE DIAGNOSES  Diagnosis: Atrial fibrillation  Assessment and Plan of Treatment: Continue with your cardiologist and primary care MD. Continue your current medications. Call your physician for palpitations, feelings of rapid heart beat, lightheadedness, or dizziness. Continue Eliquis as prescribed.    Diagnosis: CHF (congestive heart failure)  Assessment and Plan of Treatment: Take your medications as prescribed. Follow a low-salt, low salt, low cholesterol heart healthy diet. Weigh yourself every day and keep a record; call your doctor if you gain 2 pounds over one to two days or 5 pounds over three days. Get to or maintain a healthy weight; ask your heart failure team for referrals to a registered dietitian if needed. Avoid alcohol. Be active (check with your physician or cardiologist first). Find healthy ways to deal with stress, such as deep breathing, meditation, exercise, and doing hobbies that you enjoy. If you smoke, quit. (A resource to help you stop smoking is the [facility] – phone number [phone].).

## 2021-03-11 NOTE — DISCHARGE NOTE PROVIDER - HOSPITAL COURSE
HPI:  CHIEF COMPLAINT:Patient is a 71y old  Female who presents with a chief complaint of sob/palpitation.    HPI:  71 F hx of COPD, hypothyroidism, HTN, AF s/p ablation in Dec 2020, on eliquis, MV repair in 2014, cardioversion in Sept 2020 b/c of AFib, had an echo today withn ef 25 % with LVE and lae with mod MR, who recently was seen by Dr Vargas whohacolton a holter monitor who was advise to have AICD was seen in the office with increasing palpitaion and holter with multiple episodes of VT,and was sent to er.  pt had echo before with ef of 35 % and underwent ablation for A.fib 3 months ago with no sig change in EF. pt is been treated with beta blocker and losartan for heart failure for many years since the MV repair.    3/10 s/p Medtronic ICD placement, DDDR . Left chest wall site without swelling, bleeding.

## 2021-03-11 NOTE — DISCHARGE NOTE PROVIDER - NSDCCPTREATMENT_GEN_ALL_CORE_FT
PRINCIPAL PROCEDURE  Procedure: Insertion, ICD, dual chamber  Findings and Treatment: Medtronic ICD, DDDR

## 2022-11-16 NOTE — PATIENT PROFILE ADULT - AGENT'S NAME

## 2022-12-11 ENCOUNTER — INPATIENT (INPATIENT)
Facility: HOSPITAL | Age: 72
LOS: 2 days | Discharge: HOME HEALTH | DRG: 291 | End: 2022-12-14
Attending: INTERNAL MEDICINE | Admitting: INTERNAL MEDICINE
Payer: MEDICARE

## 2022-12-11 VITALS
DIASTOLIC BLOOD PRESSURE: 88 MMHG | WEIGHT: 186.07 LBS | SYSTOLIC BLOOD PRESSURE: 162 MMHG | HEIGHT: 63 IN | HEART RATE: 82 BPM | RESPIRATION RATE: 22 BRPM | OXYGEN SATURATION: 93 % | TEMPERATURE: 98 F

## 2022-12-11 DIAGNOSIS — Z98.89 OTHER SPECIFIED POSTPROCEDURAL STATES: Chronic | ICD-10-CM

## 2022-12-11 DIAGNOSIS — R06.02 SHORTNESS OF BREATH: ICD-10-CM

## 2022-12-11 LAB
ALBUMIN SERPL ELPH-MCNC: 4.4 G/DL — SIGNIFICANT CHANGE UP (ref 3.3–5)
ALP SERPL-CCNC: 60 U/L — SIGNIFICANT CHANGE UP (ref 40–120)
ALT FLD-CCNC: 9 U/L — LOW (ref 10–45)
ANION GAP SERPL CALC-SCNC: 15 MMOL/L — SIGNIFICANT CHANGE UP (ref 5–17)
APTT BLD: 38.8 SEC — HIGH (ref 27.5–35.5)
AST SERPL-CCNC: 14 U/L — SIGNIFICANT CHANGE UP (ref 10–40)
BASOPHILS # BLD AUTO: 0.03 K/UL — SIGNIFICANT CHANGE UP (ref 0–0.2)
BASOPHILS NFR BLD AUTO: 0.3 % — SIGNIFICANT CHANGE UP (ref 0–2)
BILIRUB SERPL-MCNC: 0.4 MG/DL — SIGNIFICANT CHANGE UP (ref 0.2–1.2)
BUN SERPL-MCNC: 17 MG/DL — SIGNIFICANT CHANGE UP (ref 7–23)
CALCIUM SERPL-MCNC: 8.8 MG/DL — SIGNIFICANT CHANGE UP (ref 8.4–10.5)
CHLORIDE SERPL-SCNC: 96 MMOL/L — SIGNIFICANT CHANGE UP (ref 96–108)
CK MB CFR SERPL CALC: 4.2 NG/ML — HIGH (ref 0–3.8)
CK SERPL-CCNC: 99 U/L — SIGNIFICANT CHANGE UP (ref 25–170)
CO2 SERPL-SCNC: 24 MMOL/L — SIGNIFICANT CHANGE UP (ref 22–31)
CREAT SERPL-MCNC: 0.6 MG/DL — SIGNIFICANT CHANGE UP (ref 0.5–1.3)
EGFR: 94 ML/MIN/1.73M2 — SIGNIFICANT CHANGE UP
EOSINOPHIL # BLD AUTO: 0.06 K/UL — SIGNIFICANT CHANGE UP (ref 0–0.5)
EOSINOPHIL NFR BLD AUTO: 0.6 % — SIGNIFICANT CHANGE UP (ref 0–6)
FLUAV AG NPH QL: SIGNIFICANT CHANGE UP
FLUBV AG NPH QL: SIGNIFICANT CHANGE UP
GLUCOSE SERPL-MCNC: 102 MG/DL — HIGH (ref 70–99)
HCT VFR BLD CALC: 40 % — SIGNIFICANT CHANGE UP (ref 34.5–45)
HGB BLD-MCNC: 12.8 G/DL — SIGNIFICANT CHANGE UP (ref 11.5–15.5)
IMM GRANULOCYTES NFR BLD AUTO: 0.5 % — SIGNIFICANT CHANGE UP (ref 0–0.9)
INR BLD: 1.34 RATIO — HIGH (ref 0.88–1.16)
LYMPHOCYTES # BLD AUTO: 1.29 K/UL — SIGNIFICANT CHANGE UP (ref 1–3.3)
LYMPHOCYTES # BLD AUTO: 13.1 % — SIGNIFICANT CHANGE UP (ref 13–44)
MCHC RBC-ENTMCNC: 30.3 PG — SIGNIFICANT CHANGE UP (ref 27–34)
MCHC RBC-ENTMCNC: 32 GM/DL — SIGNIFICANT CHANGE UP (ref 32–36)
MCV RBC AUTO: 94.8 FL — SIGNIFICANT CHANGE UP (ref 80–100)
MONOCYTES # BLD AUTO: 0.75 K/UL — SIGNIFICANT CHANGE UP (ref 0–0.9)
MONOCYTES NFR BLD AUTO: 7.6 % — SIGNIFICANT CHANGE UP (ref 2–14)
NEUTROPHILS # BLD AUTO: 7.66 K/UL — HIGH (ref 1.8–7.4)
NEUTROPHILS NFR BLD AUTO: 77.9 % — HIGH (ref 43–77)
NRBC # BLD: 0 /100 WBCS — SIGNIFICANT CHANGE UP (ref 0–0)
NT-PROBNP SERPL-SCNC: 1364 PG/ML — HIGH (ref 0–300)
PLATELET # BLD AUTO: 232 K/UL — SIGNIFICANT CHANGE UP (ref 150–400)
POTASSIUM SERPL-MCNC: 3.8 MMOL/L — SIGNIFICANT CHANGE UP (ref 3.5–5.3)
POTASSIUM SERPL-SCNC: 3.8 MMOL/L — SIGNIFICANT CHANGE UP (ref 3.5–5.3)
PROT SERPL-MCNC: 7.6 G/DL — SIGNIFICANT CHANGE UP (ref 6–8.3)
PROTHROM AB SERPL-ACNC: 15.6 SEC — HIGH (ref 10.5–13.4)
RBC # BLD: 4.22 M/UL — SIGNIFICANT CHANGE UP (ref 3.8–5.2)
RBC # FLD: 14.2 % — SIGNIFICANT CHANGE UP (ref 10.3–14.5)
RSV RNA NPH QL NAA+NON-PROBE: SIGNIFICANT CHANGE UP
SARS-COV-2 RNA SPEC QL NAA+PROBE: SIGNIFICANT CHANGE UP
SODIUM SERPL-SCNC: 135 MMOL/L — SIGNIFICANT CHANGE UP (ref 135–145)
TROPONIN T, HIGH SENSITIVITY RESULT: 10 NG/L — SIGNIFICANT CHANGE UP (ref 0–51)
TROPONIN T, HIGH SENSITIVITY RESULT: 10 NG/L — SIGNIFICANT CHANGE UP (ref 0–51)
TROPONIN T, HIGH SENSITIVITY RESULT: 14 NG/L — SIGNIFICANT CHANGE UP (ref 0–51)
WBC # BLD: 9.84 K/UL — SIGNIFICANT CHANGE UP (ref 3.8–10.5)
WBC # FLD AUTO: 9.84 K/UL — SIGNIFICANT CHANGE UP (ref 3.8–10.5)

## 2022-12-11 PROCEDURE — 99285 EMERGENCY DEPT VISIT HI MDM: CPT

## 2022-12-11 PROCEDURE — 71275 CT ANGIOGRAPHY CHEST: CPT | Mod: 26,MA

## 2022-12-11 PROCEDURE — 93010 ELECTROCARDIOGRAM REPORT: CPT

## 2022-12-11 RX ORDER — SIMVASTATIN 20 MG/1
20 TABLET, FILM COATED ORAL AT BEDTIME
Refills: 0 | Status: DISCONTINUED | OUTPATIENT
Start: 2022-12-11 | End: 2022-12-14

## 2022-12-11 RX ORDER — LEVOTHYROXINE SODIUM 125 MCG
75 TABLET ORAL DAILY
Refills: 0 | Status: DISCONTINUED | OUTPATIENT
Start: 2022-12-11 | End: 2022-12-14

## 2022-12-11 RX ORDER — LOSARTAN POTASSIUM 100 MG/1
25 TABLET, FILM COATED ORAL DAILY
Refills: 0 | Status: DISCONTINUED | OUTPATIENT
Start: 2022-12-11 | End: 2022-12-14

## 2022-12-11 RX ORDER — FUROSEMIDE 40 MG
20 TABLET ORAL
Refills: 0 | Status: DISCONTINUED | OUTPATIENT
Start: 2022-12-11 | End: 2022-12-14

## 2022-12-11 RX ORDER — BUDESONIDE, MICRONIZED 100 %
0.5 POWDER (GRAM) MISCELLANEOUS
Refills: 0 | Status: DISCONTINUED | OUTPATIENT
Start: 2022-12-11 | End: 2022-12-14

## 2022-12-11 RX ORDER — POTASSIUM CHLORIDE 20 MEQ
20 PACKET (EA) ORAL DAILY
Refills: 0 | Status: DISCONTINUED | OUTPATIENT
Start: 2022-12-11 | End: 2022-12-13

## 2022-12-11 RX ORDER — MONTELUKAST 4 MG/1
10 TABLET, CHEWABLE ORAL AT BEDTIME
Refills: 0 | Status: DISCONTINUED | OUTPATIENT
Start: 2022-12-11 | End: 2022-12-14

## 2022-12-11 RX ORDER — FUROSEMIDE 40 MG
40 TABLET ORAL DAILY
Refills: 0 | Status: DISCONTINUED | OUTPATIENT
Start: 2022-12-11 | End: 2022-12-11

## 2022-12-11 RX ORDER — ACETAMINOPHEN 500 MG
650 TABLET ORAL EVERY 6 HOURS
Refills: 0 | Status: DISCONTINUED | OUTPATIENT
Start: 2022-12-11 | End: 2022-12-14

## 2022-12-11 RX ORDER — APIXABAN 2.5 MG/1
5 TABLET, FILM COATED ORAL EVERY 12 HOURS
Refills: 0 | Status: DISCONTINUED | OUTPATIENT
Start: 2022-12-11 | End: 2022-12-14

## 2022-12-11 RX ORDER — METOPROLOL TARTRATE 50 MG
25 TABLET ORAL
Refills: 0 | Status: DISCONTINUED | OUTPATIENT
Start: 2022-12-11 | End: 2022-12-14

## 2022-12-11 RX ADMIN — Medication 0.5 MILLIGRAM(S): at 23:09

## 2022-12-11 RX ADMIN — SIMVASTATIN 20 MILLIGRAM(S): 20 TABLET, FILM COATED ORAL at 23:08

## 2022-12-11 RX ADMIN — Medication 40 MILLIGRAM(S): at 17:48

## 2022-12-11 RX ADMIN — APIXABAN 5 MILLIGRAM(S): 2.5 TABLET, FILM COATED ORAL at 21:00

## 2022-12-11 RX ADMIN — MONTELUKAST 10 MILLIGRAM(S): 4 TABLET, CHEWABLE ORAL at 23:08

## 2022-12-11 RX ADMIN — Medication 25 MILLIGRAM(S): at 21:01

## 2022-12-11 NOTE — ED PROVIDER NOTE - OBJECTIVE STATEMENT
73yo F w/ 71 F history of COPD (not on home O2), hypothyroidism, HTN, AF s/p ablation in Dec 2020, on eliquis, MV  Medtronic ICD placement coming in w/ worsening SOB x3 days associated w/ sharp left sided scapula pain radiating towards the left side. Was +for COVID 2 weeks ago and tested negative today but called PCP Dr. Espinoza and was told to come to the ED. Patient reports increase in weight over the past 3 days.

## 2022-12-11 NOTE — ED PROVIDER NOTE - ATTENDING CONTRIBUTION TO CARE
71 F p/w l sided upper back tenderness with sob, pos chest pain as well, denies cough of fever with hx of COPD, hypothyroidism, AICD, HTN, AF s/p ablation in Dec 2020, on eliquis, MV repair in 2014, cardioversion in Sept 2020 b/c of AFib, echo with ef 25 %, increased weight gain recently 5-10lbs, no leg swelling r/o chf vs possible pe on ac though, discuss with Dr Espinoza. 71 F p/w l sided upper back tenderness with sob, pos chest pain as well,  with recent covid infection a few weeks ago, denies cough of fever with hx of COPD, hypothyroidism, AICD, HTN, AF s/p ablation in Dec 2020, on eliquis, MV repair in 2014, cardioversion in Sept 2020 b/c of AFib, echo with ef 25 %, increased weight gain recently 5-10lbs, no leg swelling r/o chf vs possible pe on ac though, discuss with Dr Espinoza.

## 2022-12-11 NOTE — H&P ADULT - HISTORY OF PRESENT ILLNESS
CHIEF COMPLAINT:Patient is a 72y old  Female who presents with a chief complaint of     HPI:  71yo F w/ 71 F history of COPD (not on home O2), hypothyroidism, HTN, AF s/p ablation in Dec 2020, on eliquis, MV  Medtronic ICD placement coming in w/ worsening SOB x3 days associated w/ sharp left sided scapula pain radiating towards the left side. Was +for COVID 2 weeks ago and tested negative today but called PCP Dr. Espinoza and was told to come to the ED. Patient reports increase in weight over the past 3 days.    PAST MEDICAL & SURGICAL HISTORY:  Mitral valve prolapse, s/p MV repair  Mitral regurgitation  HTN (hypertension)  Varicose vein  Cardiac arrhythmia  Syncope  Hypothyroid  Osteoporosis  PAF  Status post other internal cardiac defibrillator procedure   delivery NOS  History of varicose vein stripping    MEDICATIONS  (STANDING):  furosemide   Injectable 40 milliGRAM(s) IV Push daily  noted    MEDICATIONS  (PRN):      FAMILY HISTORY:  Family history of CHF (congestive heart failure) (Father, Mother)    Family history of lung cancer    SOCIAL HISTORY:    [ x] Non-smoker  [ ] Smoker  [ ] Alcohol    Allergies    No Known Allergies    Intolerances    	    REVIEW OF SYSTEMS:  CONSTITUTIONAL: No fever, weight loss, or fatigue  EYES: No eye pain, visual disturbances, or discharge  ENT:  No difficulty hearing, tinnitus, vertigo; No sinus or throat pain  NECK: No pain or stiffness  RESPIRATORY: No cough, wheezing, chills or hemoptysis; + Shortness of Breath  CARDIOVASCULAR: + chest pain,no  palpitations, passing out, dizziness, or leg swelling  GASTROINTESTINAL: No abdominal or epigastric pain. No nausea, vomiting, or hematemesis; No diarrhea or constipation. No melena or hematochezia.  GENITOURINARY: No dysuria, frequency, hematuria, or incontinence  NEUROLOGICAL: No headaches, memory loss, loss of strength, numbness, or tremors  SKIN: No itching, burning, rashes, or lesions   LYMPH Nodes: No enlarged glands  ENDOCRINE: No heat or cold intolerance; No hair loss  MUSCULOSKELETAL: No joint pain or swelling; No muscle, back, or extremity pain  PSYCHIATRIC: No depression, anxiety, mood swings, or difficulty sleeping  HEME/LYMPH: No easy bruising, or bleeding gums  ALLERGY AND IMMUNOLOGIC: No hives or eczema	    [ ] All others negative	  [ ] Unable to obtain    PHYSICAL EXAM:  T(C): 36.4 (12 @ 14:25), Max: 36.4 (22 @ 13:21)  HR: 76 (22 @ 14:25) (76 - 82)  BP: 149/85 (22 @ 14:25) (149/85 - 162/88)  RR: 18 (22 @ 14:25) (18 - 22)  SpO2: 96% (22 @ 14:25) (93% - 96%)  Wt(kg): --  I&O's Summary      Appearance: Normal	  HEENT:   Normal oral mucosa, PERRL, EOMI	  Lymphatic: No lymphadenopathy  Cardiovascular: Normal S1 S2, No JVD, + murmurs, No edema  Respiratory: rhonchi  Psychiatry: A & O x 3, Mood & affect appropriate  Gastrointestinal:  Soft, Non-tender, + BS	  Skin: No rashes, No ecchymoses, No cyanosis	  Neurologic: Non-focal  Extremities: Normal range of motion, No clubbing, cyanosis or edema  Vascular: Peripheral pulses palpable 2+ bilaterally    TELEMETRY: 	    ECG:  	  RADIOLOGY:  OTHER: 	  	  LABS:	 	    CARDIAC MARKERS:                              12.8   9.84  )-----------( 232      ( 11 Dec 2022 14:04 )             40.0         135  |  96  |  17  ----------------------------<  102<H>  3.8   |  24  |  0.60    Ca    8.8      11 Dec 2022 14:04    TPro  7.6  /  Alb  4.4  /  TBili  0.4  /  DBili  x   /  AST  14  /  ALT  9<L>  /  AlkPhos  60      proBNP: Serum Pro-Brain Natriuretic Peptide: 1364 pg/mL ( @ 14:04)    Lipid Profile:   HgA1c:   TSH:   PT/INR - ( 11 Dec 2022 14:04 )   PT: 15.6 sec;   INR: 1.34 ratio         PTT - ( 11 Dec 2022 14:04 )  PTT:38.8 sec    PREVIOUS DIAGNOSTIC TESTING:      < from: CT Angio Chest PE Protocol w/ IV Cont (22 @ 16:02) >  1. No pulmonary embolus. Enlarged main pulmonary artery, probable   pulmonary arterial hypertension.  2. No acute pulmonary or pleural abnormality.    < from: Transesophageal Echocardiogram (20 @ 08:22) >  1. An annuloplasty ring is seen in the mitral position.  Moderate mitral regurgitation. Peak mitral valve gradient  equals 9 mm Hg, mean transmitral valve gradient equals 4 mm  Hg, which is probably normal in the setting of a An  annuloplasty ring is seen in the mitral position..  2. Severely dilated left atrium.  LA volume index = 63  cc/m2.   Spontaneous echo contrast seen.   No left atrial  or left atrial appendage thrombus.   Decreased left atrial  appendage velocities noted.  3. Moderate left ventricular enlargement.  4. Moderate global left ventricular systolic dysfunction.  Septal motion consistent with cardiac surgery.  5. Moderate right atrial enlargement.  6. Right ventricular enlargement with normal right  ventricular systolic function.  7. Normal tricuspid valve. Mild-moderate tricuspid  regurgitation.  8. Estimated pulmonary artery systolic pressure equals 44  mm Hg, assuming right atrial pressure equals 8 mm Hg,  consistent with mild pulmonary pressures.  9. Agitated saline injection and color flow Doppler  demonstrates no evidence of a patent foramen ovale.

## 2022-12-11 NOTE — ED ADULT NURSE NOTE - NSICDXPASTMEDICALHX_GEN_ALL_CORE_FT
PAST MEDICAL HISTORY:  Cardiac arrhythmia     HTN (hypertension)     Hypothyroid     Mitral regurgitation     Mitral valve prolapse     Osteoporosis     Syncope     Varicose vein      PAST MEDICAL HISTORY:  Cardiac arrhythmia     Chronic atrial fibrillation     HTN (hypertension)     Hypothyroid     Mitral regurgitation     Mitral valve prolapse     Osteoporosis     S/P mitral valve repair     Status post other internal cardiac defibrillator procedure     Syncope     Varicose vein

## 2022-12-11 NOTE — H&P ADULT - ASSESSMENT
71yo F w/ 71 F history of COPD (not on home O2), hypothyroidism, HTN, AF s/p ablation in Dec 2020, on eliquis, MV  [employer] ICD placement coming in w/ worsening SOB x3 days associated w/ sharp left sided scapula pain radiating towards the left side. Was +for COVID 2 weeks ago and tested negative today but called PCP Dr. Espinoza and was told to come to the ED. Patient reports increase in weight over the past 3 days.  chf acute on chronic with pulmonary htn sec to MR/ s/p MV repair  continue metoprolol and AC sec to hx of PAF  echo  copd/ asthma, continue inhalers  chest pain doubt cardiac check cardiac e  hx of covid recently, PE ruled out

## 2022-12-11 NOTE — ED PROVIDER NOTE - PHYSICAL EXAMINATION
GENERAL: well appearing in no acute distress, non-toxic appearing  HEAD: normocephalic, atraumatic  HENT: airway intact  EYES: normal conjunctiva  CARDIAC: regular rate and rhythm, normal S1S2, no appreciable murmurs, 2+ pulses in UE/LE b/l  PULM: normal breath sounds, clear to ascultation bilaterally, no rales, rhonchi, wheezing  GI: abdomen nondistended, soft, nontender, no guarding, rebound tenderness  NEURO: no focal motor or sensory deficits  MSK: no peripheral edema, no calf tenderness b/l  SKIN: well-perfused, extremities warm, no visible rashes

## 2022-12-11 NOTE — ED ADULT NURSE NOTE - OBJECTIVE STATEMENT
72 year old female, A&Ox4, PMH of afib with defibrillator, mitral valve repair in 2014, presenting to ED complaining of shortness of breath. Patient started to experience shortness of breath on exertion starting friday accompanied by chest pressure. Patient states she is comfortable at rest.  Patient also complaining of left pain under the scapula that radiates around her left chest to below her breast. Patient also complaining of numbness and tingling in the left hand and 4th and 5th digits for 5 days. Patient recently had COVID approximately 2 weeks prior and has felt very fatigued since. Patient denies cough, fevers, palpitations, N/V/D at this time. Heart rate and rhythm regular. Respirations clear and equal bilaterally. Abdomen soft, nontender and nondistended. Peripheral pulses strong and equal bilaterally. IV placed and labs drawn. Patient placed on cardiac monitor. Safety and comfort measures maintained. 72 year old female, A&Ox4, PMH of afib on Eliquis with defibrillator, COPD, mitral valve repair in 2014, presenting to ED complaining of shortness of breath. Patient started to experience shortness of breath on exertion starting friday accompanied by chest pressure. Patient states she is comfortable at rest.  Patient also complaining of left pain under the scapula that radiates around her left chest to below her breast. Patient also complaining of numbness and tingling in the left hand and 4th and 5th digits for 5 days. Patient recently had COVID approximately 2 weeks prior and has felt very fatigued since. Patient denies cough, fevers, palpitations, N/V/D at this time. Heart rate and rhythm regular. Respirations clear and equal bilaterally. Abdomen soft, nontender and nondistended. Peripheral pulses strong and equal bilaterally. IV placed and labs drawn. Patient placed on cardiac monitor. Safety and comfort measures maintained.

## 2022-12-11 NOTE — ED PROVIDER NOTE - NS ED ROS FT
General: denies fever, chills  HENT: denies nasal congestion, rhinorrhea  Eyes: denies visual changes, blurred vision  CV: + chest pain  Resp: +SOB   Abdominal: denies nausea, vomiting, diarrhea, abdominal pain  : denies urinary pain or discharge  MSK: denies muscle aches, leg swelling  Neuro: denies headaches, numbness, tingling  Skin: denies rashes, bruises

## 2022-12-12 LAB
ANION GAP SERPL CALC-SCNC: 13 MMOL/L — SIGNIFICANT CHANGE UP (ref 5–17)
BUN SERPL-MCNC: 16 MG/DL — SIGNIFICANT CHANGE UP (ref 7–23)
CALCIUM SERPL-MCNC: 9.3 MG/DL — SIGNIFICANT CHANGE UP (ref 8.4–10.5)
CHLORIDE SERPL-SCNC: 97 MMOL/L — SIGNIFICANT CHANGE UP (ref 96–108)
CK MB BLD-MCNC: 4.2 % — HIGH (ref 0–3.5)
CO2 SERPL-SCNC: 28 MMOL/L — SIGNIFICANT CHANGE UP (ref 22–31)
CREAT SERPL-MCNC: 0.73 MG/DL — SIGNIFICANT CHANGE UP (ref 0.5–1.3)
EGFR: 86 ML/MIN/1.73M2 — SIGNIFICANT CHANGE UP
GLUCOSE SERPL-MCNC: 95 MG/DL — SIGNIFICANT CHANGE UP (ref 70–99)
HCT VFR BLD CALC: 41.6 % — SIGNIFICANT CHANGE UP (ref 34.5–45)
HGB BLD-MCNC: 13.8 G/DL — SIGNIFICANT CHANGE UP (ref 11.5–15.5)
MAGNESIUM SERPL-MCNC: 2.1 MG/DL — SIGNIFICANT CHANGE UP (ref 1.6–2.6)
MCHC RBC-ENTMCNC: 31.2 PG — SIGNIFICANT CHANGE UP (ref 27–34)
MCHC RBC-ENTMCNC: 33.2 GM/DL — SIGNIFICANT CHANGE UP (ref 32–36)
MCV RBC AUTO: 94.1 FL — SIGNIFICANT CHANGE UP (ref 80–100)
NRBC # BLD: 0 /100 WBCS — SIGNIFICANT CHANGE UP (ref 0–0)
PHOSPHATE SERPL-MCNC: 3.4 MG/DL — SIGNIFICANT CHANGE UP (ref 2.5–4.5)
PLATELET # BLD AUTO: 267 K/UL — SIGNIFICANT CHANGE UP (ref 150–400)
POTASSIUM SERPL-MCNC: 3.5 MMOL/L — SIGNIFICANT CHANGE UP (ref 3.5–5.3)
POTASSIUM SERPL-SCNC: 3.5 MMOL/L — SIGNIFICANT CHANGE UP (ref 3.5–5.3)
RBC # BLD: 4.42 M/UL — SIGNIFICANT CHANGE UP (ref 3.8–5.2)
RBC # FLD: 14.6 % — HIGH (ref 10.3–14.5)
SODIUM SERPL-SCNC: 138 MMOL/L — SIGNIFICANT CHANGE UP (ref 135–145)
WBC # BLD: 9.71 K/UL — SIGNIFICANT CHANGE UP (ref 3.8–10.5)
WBC # FLD AUTO: 9.71 K/UL — SIGNIFICANT CHANGE UP (ref 3.8–10.5)

## 2022-12-12 PROCEDURE — 93306 TTE W/DOPPLER COMPLETE: CPT | Mod: 26

## 2022-12-12 RX ORDER — AMIODARONE HYDROCHLORIDE 400 MG/1
200 TABLET ORAL DAILY
Refills: 0 | Status: DISCONTINUED | OUTPATIENT
Start: 2022-12-12 | End: 2022-12-14

## 2022-12-12 RX ADMIN — Medication 25 MILLIGRAM(S): at 17:46

## 2022-12-12 RX ADMIN — Medication 0.5 MILLIGRAM(S): at 05:23

## 2022-12-12 RX ADMIN — LOSARTAN POTASSIUM 25 MILLIGRAM(S): 100 TABLET, FILM COATED ORAL at 05:24

## 2022-12-12 RX ADMIN — SIMVASTATIN 20 MILLIGRAM(S): 20 TABLET, FILM COATED ORAL at 22:51

## 2022-12-12 RX ADMIN — Medication 20 MILLIGRAM(S): at 13:32

## 2022-12-12 RX ADMIN — Medication 20 MILLIGRAM(S): at 05:23

## 2022-12-12 RX ADMIN — APIXABAN 5 MILLIGRAM(S): 2.5 TABLET, FILM COATED ORAL at 05:24

## 2022-12-12 RX ADMIN — Medication 25 MILLIGRAM(S): at 05:24

## 2022-12-12 RX ADMIN — AMIODARONE HYDROCHLORIDE 200 MILLIGRAM(S): 400 TABLET ORAL at 05:51

## 2022-12-12 RX ADMIN — Medication 0.5 MILLIGRAM(S): at 17:46

## 2022-12-12 RX ADMIN — Medication 20 MILLIEQUIVALENT(S): at 13:32

## 2022-12-12 RX ADMIN — APIXABAN 5 MILLIGRAM(S): 2.5 TABLET, FILM COATED ORAL at 17:46

## 2022-12-12 RX ADMIN — Medication 75 MICROGRAM(S): at 05:24

## 2022-12-12 RX ADMIN — MONTELUKAST 10 MILLIGRAM(S): 4 TABLET, CHEWABLE ORAL at 22:51

## 2022-12-12 NOTE — CONSULT NOTE ADULT - SUBJECTIVE AND OBJECTIVE BOX
Patient presents for her routine OB visit.    Patient would like communication of their results via:        Cell Phone:   Telephone Information:   Mobile [phone]     Okay to leave a message containing results? Yes  Patient's current [facility] status: Active.    Patient seen, examined, and interviewed by me, case discussed with me, chart reviewed  full note to follow   Dr. YVROSE Messina ([phone])                               ( Note written / Date of service 12-12-22 )    ==================>> MEDICATIONS <<====================    aMIOdarone    Tablet 200 milliGRAM(s) Oral daily  apixaban 5 milliGRAM(s) Oral every 12 hours  buDESOnide    Inhalation Suspension 0.5 milliGRAM(s) Inhalation two times a day  furosemide   Injectable 20 milliGRAM(s) IV Push two times a day  levothyroxine 75 MICROGram(s) Oral daily  losartan 25 milliGRAM(s) Oral daily  metoprolol tartrate 25 milliGRAM(s) Oral two times a day  montelukast 10 milliGRAM(s) Oral at bedtime  potassium chloride    Tablet ER 20 milliEquivalent(s) Oral daily  simvastatin 20 milliGRAM(s) Oral at bedtime    MEDICATIONS  (PRN):  acetaminophen     Tablet .. 650 milliGRAM(s) Oral every 6 hours PRN Temp greater or equal to 38C (100.4F), Moderate Pain (4 - 6)    ___________  Active diet:  Diet, Regular  ___________________    ==================>> VITAL SIGNS <<==================  Height (cm): 160  Weight (kg): 84.4  BMI (kg/m2): 33  Vital Signs Last 24 HrsT(C): 36.3 (12-12-22 @ 17:19)  T(F): 97.4 (12-12-22 @ 17:19), Max: 97.9 (12-12-22 @ 12:43)  HR: 68 (12-12-22 @ 17:19) (68 - 89)  BP: 123/81 (12-12-22 @ 17:19)  RR: 18 (12-12-22 @ 17:19) (17 - 19)  SpO2: 92% (12-12-22 @ 17:19) (91% - 94%)      CAPILLARY BLOOD GLUCOSE         ==================>> LAB AND IMAGING <<==================                        13.8   9.71  )-----------( 267      ( 12 Dec 2022 06:43 )             41.6        12-12    138  |  97  |  16  ----------------------------<  95  3.5   |  28  |  0.73    Ca    9.3      12 Dec 2022 06:43    TPro  7.6  /  Alb  4.4  /  TBili  0.4  /  DBili  x   /  AST  14  /  ALT  9<L>  /  AlkPhos  60  12-11    WBC count:   9.71 <<== ,  9.84 <<==   Hemoglobin:   13.8 <<==,  12.8 <<==  platelets:  267 <==, 232 <==    Creatinine:  0.73  <<==, 0.60  <<==  Sodium:   138  <==, 135  <==       AST:          14 <==      ALT:        9  <==      AP:        60  <=     Bili:        0.4  <=    ____________________________    M I C R O B I O L O G Y :           ---___---___---___---___---___---___ ---___---___---___---___---___---___---___---___---                  M E D I C A L   A T T E N D I N G    C O N S U L T A T I O N   N O T E  ---___---___---___---___---___---___ ---___---___---___---___---___---___---___---___---                                                              ( Note Written / Date of service :  22 )      - Patient seen and examined by me earlier today.   ++CHIEF COMPLAINT:   Patient is a 72y old  Female who presents with a chief complaint of sob (12 Dec 2022 18:40)      ++  HPI:  CHIEF COMPLAINT:Patient is a 72y old  Female who presents with a chief complaint of     HPI:  73yo F w/ 71 F history of COPD (not on home O2), hypothyroidism, HTN, AF s/p ablation in Dec 2020, on eliquis, MV  Medtronic ICD placement coming in w/ worsening SOB x3 days associated w/ sharp left sided scapula pain radiating towards the left side. Was +for COVID 2 weeks ago and tested negative today but called PCP Dr. Espinoza and was told to come to the ED. Patient reports increase in weight over the past 3 days.    PAST MEDICAL & SURGICAL HISTORY:  Mitral valve prolapse, s/p MV repair  Mitral regurgitation  HTN (hypertension)  Varicose vein  Cardiac arrhythmia  Syncope  Hypothyroid  Osteoporosis  PAF  Status post other internal cardiac defibrillator procedure   delivery NOS  History of varicose vein stripping    MEDICATIONS  (STANDING):  furosemide   Injectable 40 milliGRAM(s) IV Push daily  noted    MEDICATIONS  (PRN):      FAMILY HISTORY:  Family history of CHF (congestive heart failure) (Father, Mother)    Family history of lung cancer    SOCIAL HISTORY:    [ x] Non-smoker  [ ] Smoker  [ ] Alcohol    Allergies    No Known Allergies    Intolerances    	    REVIEW OF SYSTEMS:  CONSTITUTIONAL: No fever, weight loss, or fatigue  EYES: No eye pain, visual disturbances, or discharge  ENT:  No difficulty hearing, tinnitus, vertigo; No sinus or throat pain  NECK: No pain or stiffness  RESPIRATORY: No cough, wheezing, chills or hemoptysis; + Shortness of Breath  CARDIOVASCULAR: + chest pain,no  palpitations, passing out, dizziness, or leg swelling  GASTROINTESTINAL: No abdominal or epigastric pain. No nausea, vomiting, or hematemesis; No diarrhea or constipation. No melena or hematochezia.  GENITOURINARY: No dysuria, frequency, hematuria, or incontinence  NEUROLOGICAL: No headaches, memory loss, loss of strength, numbness, or tremors  SKIN: No itching, burning, rashes, or lesions   LYMPH Nodes: No enlarged glands  ENDOCRINE: No heat or cold intolerance; No hair loss  MUSCULOSKELETAL: No joint pain or swelling; No muscle, back, or extremity pain  PSYCHIATRIC: No depression, anxiety, mood swings, or difficulty sleeping  HEME/LYMPH: No easy bruising, or bleeding gums  ALLERGY AND IMMUNOLOGIC: No hives or eczema	    [ ] All others negative	  [ ] Unable to obtain    PHYSICAL EXAM:  T(C): 36.4 (22 @ 14:25), Max: 36.4 (22 @ 13:21)  HR: 76 (22 @ 14:25) (76 - 82)  BP: 149/85 (22 @ 14:25) (149/85 - 162/88)  RR: 18 (22 @ 14:25) (18 - 22)  SpO2: 96% (22 @ 14:25) (93% - 96%)  Wt(kg): --  I&O's Summary      Appearance: Normal	  HEENT:   Normal oral mucosa, PERRL, EOMI	  Lymphatic: No lymphadenopathy  Cardiovascular: Normal S1 S2, No JVD, + murmurs, No edema  Respiratory: rhonchi  Psychiatry: A & O x 3, Mood & affect appropriate  Gastrointestinal:  Soft, Non-tender, + BS	  Skin: No rashes, No ecchymoses, No cyanosis	  Neurologic: Non-focal  Extremities: Normal range of motion, No clubbing, cyanosis or edema  Vascular: Peripheral pulses palpable 2+ bilaterally    TELEMETRY: 	    ECG:  	  RADIOLOGY:  OTHER: 	  	  LABS:	 	    CARDIAC MARKERS:                              12.8   9.84  )-----------( 232      ( 11 Dec 2022 14:04 )             40.0         135  |  96  |  17  ----------------------------<  102<H>  3.8   |  24  |  0.60    Ca    8.8      11 Dec 2022 14:04    TPro  7.6  /  Alb  4.4  /  TBili  0.4  /  DBili  x   /  AST  14  /  ALT  9<L>  /  AlkPhos  60      proBNP: Serum Pro-Brain Natriuretic Peptide: 1364 pg/mL ( @ 14:04)    Lipid Profile:   HgA1c:   TSH:   PT/INR - ( 11 Dec 2022 14:04 )   PT: 15.6 sec;   INR: 1.34 ratio         PTT - ( 11 Dec 2022 14:04 )  PTT:38.8 sec    PREVIOUS DIAGNOSTIC TESTING:      < from: CT Angio Chest PE Protocol w/ IV Cont (22 @ 16:02) >  1. No pulmonary embolus. Enlarged main pulmonary artery, probable   pulmonary arterial hypertension.  2. No acute pulmonary or pleural abnormality.    < from: Transesophageal Echocardiogram (20 @ 08:22) >  1. An annuloplasty ring is seen in the mitral position.  Moderate mitral regurgitation. Peak mitral valve gradient  equals 9 mm Hg, mean transmitral valve gradient equals 4 mm  Hg, which is probably normal in the setting of a An  annuloplasty ring is seen in the mitral position..  2. Severely dilated left atrium.  LA volume index = 63  cc/m2.   Spontaneous echo contrast seen.   No left atrial  or left atrial appendage thrombus.   Decreased left atrial  appendage velocities noted.  3. Moderate left ventricular enlargement.  4. Moderate global left ventricular systolic dysfunction.  Septal motion consistent with cardiac surgery.  5. Moderate right atrial enlargement.  6. Right ventricular enlargement with normal right  ventricular systolic function.  7. Normal tricuspid valve. Mild-moderate tricuspid  regurgitation.  8. Estimated pulmonary artery systolic pressure equals 44  mm Hg, assuming right atrial pressure equals 8 mm Hg,  consistent with mild pulmonary pressures.  9. Agitated saline injection and color flow Doppler  demonstrates no evidence of a patent foramen ovale.             (11 Dec 2022 17:45)      ---___---___---___---___---___---  PAST MEDICAL & SURGICAL HISTORY:  Mitral valve prolapse      Mitral regurgitation      HTN (hypertension)      Varicose vein      Cardiac arrhythmia      Syncope      Hypothyroid      Osteoporosis      Chronic atrial fibrillation      S/P mitral valve repair      Status post other internal cardiac defibrillator procedure       delivery NOS      History of varicose vein stripping      S/P mitral valve repair        ---___---___---___---___---___---   FAMILY HISTORY:  Family history of CHF (congestive heart failure) (Father, Mother)    Family history of lung cancer        ---___---___---___---___---___---  SOCIAL HISTORY:  Alcohol: None reported  Smoking: None reported    ---___---___---___---___---___---  ALLERGIES:   Allergies    No Known Allergies    Intolerances      ---___---___---___---___---___---  MEDICATIONS:  MEDICATIONS  (STANDING):  aMIOdarone    Tablet 200 milliGRAM(s) Oral daily  apixaban 5 milliGRAM(s) Oral every 12 hours  buDESOnide    Inhalation Suspension 0.5 milliGRAM(s) Inhalation two times a day  furosemide   Injectable 20 milliGRAM(s) IV Push two times a day  levothyroxine 75 MICROGram(s) Oral daily  losartan 25 milliGRAM(s) Oral daily  metoprolol tartrate 25 milliGRAM(s) Oral two times a day  montelukast 10 milliGRAM(s) Oral at bedtime  potassium chloride    Tablet ER 20 milliEquivalent(s) Oral daily  simvastatin 20 milliGRAM(s) Oral at bedtime    MEDICATIONS  (PRN):  acetaminophen     Tablet .. 650 milliGRAM(s) Oral every 6 hours PRN Temp greater or equal to 38C (100.4F), Moderate Pain (4 - 6)    ___________  Active diet:  Diet, Regular  ___________________    HOME MEDICATIONS:  Artificial Tears ophthalmic solution  Breo Ellipta 100 mcg-25 mcg/inh inhalation powder  Eliquis 5 mg oral tablet  furosemide 20 mg oral tablet  levothyroxine 75 mcg (0.075 mg) oral tablet  losartan 25 mg oral tablet  Metamucil 3.4 g/5.2 g oral powder for reconstitution  montelukast 10 mg oral tablet  potassium chloride 10 mEq oral tablet, extended release  Saline Mist 0.65% nasal spray (obsolete)  simvastatin 20 mg oral tablet  Tylenol 500 mg oral tablet  Vitamin C 500 mg oral tablet  Vitamin D3 2000 intl units (50 mcg) oral tablet    ---___---___---___---___---___---  REVIEW OF SYSTEM:    GEN: no fever, no chills, no pain  RESP: no SOB, no cough, no sputum  CVS: no chest pain, no palpitations, no edema  GI: no abdominal pain, no nausea, no vomiting, no constipation, no diarrhea  : no dysuria, no frequency, no hematuria  Neuro: no headache, no dizziness  PSYCH: no anxiety, no depression  Derm : no itching, no rash    ==================>> VITAL SIGNS <<==================  Height (cm): 160  Weight (kg): 84.4  BMI (kg/m2): 33  Vital Signs Last 24 HrsT(C): 36.3 (22 @ 17:19)  T(F): 97.4 (22 @ 17:19), Max: 97.9 (22 @ 12:43)  HR: 68 (22 @ 17:19) (68 - 89)  BP: 123/81 (22 @ 17:19)  RR: 18 (22 @ 17:19) (17 - 19)  SpO2: 92% (22 @ 17:19) (91% - 94%)        I&O's Summary    12 Dec 2022 07:01  -  12 Dec 2022 22:00  --------------------------------------------------------  IN: 360 mL / OUT: 0 mL / NET: 360 mL      ---___---___---___---___---___---  PHYSICAL EXAM:    GEN: A&O X 3 , NAD , comfortable, pleasant, calm   HEENT: NCAT, PERRL, MMM, hearing intact  Neck: supple , no JVD  CVS: S1S2 , regular , No M/R/G appreciated  PULM: CTA B/L,  no W/R/R appreciated  ABD.: soft. non tender, non distended,  bowel sounds present  Extrem: intact pulses , no edema   Derm: No rash , no ecchymoses  PSYCH : normal mood,  no delusion not anxious     ==================>> LAB AND IMAGING <<==================                        13.8   9.71  )-----------( 267      ( 12 Dec 2022 06:43 )             41.6            138  |  97  |  16  ----------------------------<  95  3.5   |  28  |  0.73    Ca    9.3      12 Dec 2022 06:43    TPro  7.6  /  Alb  4.4  /  TBili  0.4  /  DBili  x   /  AST  14  /  ALT  9<L>  /  AlkPhos  60      WBC count:   9.71 <<== ,  9.84 <<==   Hemoglobin:   13.8 <<==,  12.8 <<==  platelets:  267 <==, 232 <==    Creatinine:  0.73  <<==, 0.60  <<==  Sodium:   138  <==, 135  <==     ---___---___---___---___---___---___ ---___---___---___---___---                         A S S E S S M E N T   A N D   P L A N :      HEALTH ISSUES - PROBLEM Dx:              -GI/DVT Prophylaxis per protocol.    --------------------------------------------  Case discussed with   Education given on findings and plan of care.  ___________________________  Will follow with you.  Thank you,  YVROSE Messina D.O.  Pager: [phone]                               ( Note written / Date of service 22 )    ==================>> MEDICATIONS <<====================    aMIOdarone    Tablet 200 milliGRAM(s) Oral daily  apixaban 5 milliGRAM(s) Oral every 12 hours  buDESOnide    Inhalation Suspension 0.5 milliGRAM(s) Inhalation two times a day  furosemide   Injectable 20 milliGRAM(s) IV Push two times a day  levothyroxine 75 MICROGram(s) Oral daily  losartan 25 milliGRAM(s) Oral daily  metoprolol tartrate 25 milliGRAM(s) Oral two times a day  montelukast 10 milliGRAM(s) Oral at bedtime  potassium chloride    Tablet ER 20 milliEquivalent(s) Oral daily  simvastatin 20 milliGRAM(s) Oral at bedtime    MEDICATIONS  (PRN):  acetaminophen     Tablet .. 650 milliGRAM(s) Oral every 6 hours PRN Temp greater or equal to 38C (100.4F), Moderate Pain (4 - 6)    ___________  Active diet:  Diet, Regular  ___________________      CAPILLARY BLOOD GLUCOSE                 ---___---___---___---___---___---___ ---___---___---___---___---___---___---___---___---                  M E D I C A L   A T T E N D I N G    C O N S U L T A T I O N   N O T E  ---___---___---___---___---___---___ ---___---___---___---___---___---___---___---___---                                                              ( Note Written / Date of service :  22 )      - Patient seen and examined by me earlier today.   ++CHIEF COMPLAINT:   Patient is a 72y old  Female who presents with a chief complaint of sob (12 Dec 2022 18:40)    ++  HPI:  Pt is a 73 yo woman w/ history of COPD (not on home O2), hypothyroidism, HTN, AF s/p ablation in Dec 2020, on eliquis, post MV repair,  Medtronic ICD placement coming in w/ worsening SOB x3 days associated w/ sharp left sided scapula pain radiating towards the left side. Was +for COVID 2 weeks ago and tested negative today but called PCP Dr. Espinzoa and was told to come to the ED. Patient reports increase in weight over the past 3 days.    ---___---___---___---___---___---  PAST MEDICAL & SURGICAL HISTORY:    Mitral valve prolapse  Mitral regurgitation  HTN (hypertension)  Varicose vein  Cardiac arrhythmia  Syncope  Hypothyroid  Osteoporosis  Chronic atrial fibrillation    S/P mitral valve repair  Status post other internal cardiac defibrillator procedure   delivery NOS  History of varicose vein stripping    ---___---___---___---___---___---   FAMILY HISTORY:    Family history of CHF (congestive heart failure) (Father, Mother)  Family history of lung cancer    ---___---___---___---___---___---  SOCIAL HISTORY:  Alcohol: None reported  Smoking: None reported    ---___---___---___---___---___---  ALLERGIES:     No Known Allergies    ---___---___---___---___---___---  MEDICATIONS:  MEDICATIONS  (STANDING):  aMIOdarone    Tablet 200 milliGRAM(s) Oral daily  apixaban 5 milliGRAM(s) Oral every 12 hours  buDESOnide    Inhalation Suspension 0.5 milliGRAM(s) Inhalation two times a day  furosemide   Injectable 20 milliGRAM(s) IV Push two times a day  levothyroxine 75 MICROGram(s) Oral daily  losartan 25 milliGRAM(s) Oral daily  metoprolol tartrate 25 milliGRAM(s) Oral two times a day  montelukast 10 milliGRAM(s) Oral at bedtime  potassium chloride    Tablet ER 20 milliEquivalent(s) Oral daily  simvastatin 20 milliGRAM(s) Oral at bedtime    MEDICATIONS  (PRN):  acetaminophen     Tablet .. 650 milliGRAM(s) Oral every 6 hours PRN Temp greater or equal to 38C (100.4F), Moderate Pain (4 - 6)    ___________  Active diet:  Diet, Regular  ___________________    HOME MEDICATIONS:  Artificial Tears ophthalmic solution  Breo Ellipta 100 mcg-25 mcg/inh inhalation powder  Eliquis 5 mg oral tablet  furosemide 20 mg oral tablet  levothyroxine 75 mcg (0.075 mg) oral tablet  losartan 25 mg oral tablet  Metamucil 3.4 g/5.2 g oral powder for reconstitution  montelukast 10 mg oral tablet  potassium chloride 10 mEq oral tablet, extended release  Saline Mist 0.65% nasal spray (obsolete)  simvastatin 20 mg oral tablet  Tylenol 500 mg oral tablet  Vitamin C 500 mg oral tablet  Vitamin D3 2000 intl units (50 mcg) oral tablet    ---___---___---___---___---___---  REVIEW OF SYSTEM:    GEN: no fever, no chills, no pain  RESP: no SOB, no cough, no sputum..    much improved   CVS: no chest pain, no palpitations, no edema  GI: no abdominal pain, no nausea, no vomiting, no constipation, no diarrhea  : no dysuria, no frequency, no hematuria  Neuro: no headache, no dizziness  PSYCH: no anxiety, no depression  Derm : no itching, no rash    ==================>> VITAL SIGNS <<==================    Height (cm): 160  Weight (kg): 84.4  BMI (kg/m2): 33  Vital Signs Last 24 HrsT(C): 36.3 (22 @ 17:19)  T(F): 97.4 (22 @ 17:19), Max: 97.9 (22 @ 12:43)  HR: 68 (22 @ 17:19) (68 - 89)  BP: 123/81 (22 @ 17:19)  RR: 18 (22 @ 17:19) (17 - 19)  SpO2: 92% (22 @ 17:19) (91% - 94%)      I&O's Summary  12 Dec 2022 07:01  -  12 Dec 2022 22:00  --------------------------------------------------------  IN: 360 mL / OUT: 0 mL / NET: 360 mL    ---___---___---___---___---___---  PHYSICAL EXAM:    GEN: A&O X 3 , NAD , comfortable, pleasant, calm , in good spirits   HEENT: NCAT, PERRL, MMM, hearing intact  Neck: supple , no JVD  CVS: S1S2 , regular , No M/R/G appreciated  PULM: CTA B/L,  no W/R/R appreciated  ABD.: soft. non tender, non distended,  bowel sounds present, obese   Extrem: intact pulses , no significant edema   Derm: No rash , no ecchymoses  PSYCH : normal mood,  no delusion not anxious     ==================>> LAB AND IMAGING <<==================                        13.8   9.71  )-----------( 267      ( 12 Dec 2022 06:43 )             41.6            138  |  97  |  16  ----------------------------<  95  3.5   |  28  |  0.73    Ca    9.3      12 Dec 2022 06:43    TPro  7.6  /  Alb  4.4  /  TBili  0.4  /  DBili  x   /  AST  14  /  ALT  9<L>  /  AlkPhos  60      WBC count:   9.71 <<== ,  9.84 <<==   Hemoglobin:   13.8 <<==,  12.8 <<==  platelets:  267 <==, 232 <==    Creatinine:  0.73  <<==, 0.60  <<==  Sodium:   138  <==, 135  <==    < from: CT Angio Chest PE Protocol w/ IV Cont (22 @ 16:02) >  1. No pulmonary embolus. Enlarged main pulmonary artery, probable   pulmonary arterial hypertension.  2. No acute pulmonary or pleural abnormality.    < from: Transesophageal Echocardiogram (20 @ 08:22) >  1. An annuloplasty ring is seen in the mitral position.  Moderate mitral regurgitation. Peak mitral valve gradient  equals 9 mm Hg, mean transmitral valve gradient equals 4 mm  Hg, which is probably normal in the setting of a An  annuloplasty ring is seen in the mitral position..  2. Severely dilated left atrium.  LA volume index = 63  cc/m2.   Spontaneous echo contrast seen.   No left atrial  or left atrial appendage thrombus.   Decreased left atrial  appendage velocities noted.  3. Moderate left ventricular enlargement.  4. Moderate global left ventricular systolic dysfunction.  Septal motion consistent with cardiac surgery.  5. Moderate right atrial enlargement.  6. Right ventricular enlargement with normal right  ventricular systolic function.  7. Normal tricuspid valve. Mild-moderate tricuspid regurgitation.  8. Estimated pulmonary artery systolic pressure equals 44  mm Hg, assuming right atrial pressure equals 8 mm Hg,  consistent with mild pulmonary pressures.  9. Agitated saline injection and color flow Doppler  demonstrates no evidence of a patent foramen ovale.   (11 Dec 2022 17:45)     ---___---___---___---___---___---___ ---___---___---___---___---                         A S S E S S M E N T   A N D   P L A N :    acute on chronic systolic CHF  SOB / dyspnea  Mitral valve disease post repair  COPD  AF on AC  obesity  hypothyroidism  hypokalemia    ========>>>  appreciated cardio mgmt  overall improvedpost diuretics  no PE on CT  monitor BMP  supplement hypokalemia / hypomagnesemia as needed  keep K~4, Mag ~2  echo as above   continue full AC  Continue Current medications otherwise and monitor.  OOB / ambulate  supportive care  early discharge planing    -GI/DVT Prophylaxis per protocol.    --------------------------------------------  Case discussed with patient, cardio  Education given on findings and plan of care.  ___________________________  Will follow with you.  Thank you,  YVROSE Messina D.O.  Pager: [phone]

## 2022-12-13 ENCOUNTER — TRANSCRIPTION ENCOUNTER (OUTPATIENT)
Age: 72
End: 2022-12-13

## 2022-12-13 LAB
ANION GAP SERPL CALC-SCNC: 16 MMOL/L — SIGNIFICANT CHANGE UP (ref 5–17)
BUN SERPL-MCNC: 23 MG/DL — SIGNIFICANT CHANGE UP (ref 7–23)
CALCIUM SERPL-MCNC: 9.2 MG/DL — SIGNIFICANT CHANGE UP (ref 8.4–10.5)
CHLORIDE SERPL-SCNC: 97 MMOL/L — SIGNIFICANT CHANGE UP (ref 96–108)
CO2 SERPL-SCNC: 24 MMOL/L — SIGNIFICANT CHANGE UP (ref 22–31)
CREAT SERPL-MCNC: 0.68 MG/DL — SIGNIFICANT CHANGE UP (ref 0.5–1.3)
EGFR: 91 ML/MIN/1.73M2 — SIGNIFICANT CHANGE UP
GLUCOSE SERPL-MCNC: 123 MG/DL — HIGH (ref 70–99)
HCT VFR BLD CALC: 42.8 % — SIGNIFICANT CHANGE UP (ref 34.5–45)
HGB BLD-MCNC: 13.7 G/DL — SIGNIFICANT CHANGE UP (ref 11.5–15.5)
MAGNESIUM SERPL-MCNC: 2.2 MG/DL — SIGNIFICANT CHANGE UP (ref 1.6–2.6)
MCHC RBC-ENTMCNC: 30.6 PG — SIGNIFICANT CHANGE UP (ref 27–34)
MCHC RBC-ENTMCNC: 32 GM/DL — SIGNIFICANT CHANGE UP (ref 32–36)
MCV RBC AUTO: 95.7 FL — SIGNIFICANT CHANGE UP (ref 80–100)
NRBC # BLD: 0 /100 WBCS — SIGNIFICANT CHANGE UP (ref 0–0)
NT-PROBNP SERPL-SCNC: 262 PG/ML — SIGNIFICANT CHANGE UP (ref 0–300)
PLATELET # BLD AUTO: 246 K/UL — SIGNIFICANT CHANGE UP (ref 150–400)
POTASSIUM SERPL-MCNC: 3.9 MMOL/L — SIGNIFICANT CHANGE UP (ref 3.5–5.3)
POTASSIUM SERPL-SCNC: 3.9 MMOL/L — SIGNIFICANT CHANGE UP (ref 3.5–5.3)
RBC # BLD: 4.47 M/UL — SIGNIFICANT CHANGE UP (ref 3.8–5.2)
RBC # FLD: 14.6 % — HIGH (ref 10.3–14.5)
SODIUM SERPL-SCNC: 137 MMOL/L — SIGNIFICANT CHANGE UP (ref 135–145)
WBC # BLD: 9.47 K/UL — SIGNIFICANT CHANGE UP (ref 3.8–10.5)
WBC # FLD AUTO: 9.47 K/UL — SIGNIFICANT CHANGE UP (ref 3.8–10.5)

## 2022-12-13 RX ORDER — APIXABAN 2.5 MG/1
1 TABLET, FILM COATED ORAL
Qty: 0 | Refills: 0 | DISCHARGE

## 2022-12-13 RX ORDER — POTASSIUM CHLORIDE 20 MEQ
1 PACKET (EA) ORAL
Qty: 0 | Refills: 0 | DISCHARGE

## 2022-12-13 RX ORDER — SODIUM CHLORIDE 0.65 %
2 AEROSOL, SPRAY (ML) NASAL
Qty: 0 | Refills: 0 | DISCHARGE

## 2022-12-13 RX ORDER — CHLORHEXIDINE GLUCONATE 213 G/1000ML
1 SOLUTION TOPICAL
Refills: 0 | Status: DISCONTINUED | OUTPATIENT
Start: 2022-12-13 | End: 2022-12-14

## 2022-12-13 RX ORDER — ACETAMINOPHEN 500 MG
2 TABLET ORAL
Qty: 0 | Refills: 0 | DISCHARGE

## 2022-12-13 RX ORDER — LOSARTAN POTASSIUM 100 MG/1
1 TABLET, FILM COATED ORAL
Qty: 0 | Refills: 0 | DISCHARGE

## 2022-12-13 RX ORDER — PSYLLIUM SEED (WITH DEXTROSE)
1 POWDER (GRAM) ORAL
Qty: 0 | Refills: 0 | DISCHARGE

## 2022-12-13 RX ORDER — LEVOTHYROXINE SODIUM 125 MCG
1 TABLET ORAL
Qty: 0 | Refills: 0 | DISCHARGE

## 2022-12-13 RX ORDER — CHOLECALCIFEROL (VITAMIN D3) 125 MCG
1 CAPSULE ORAL
Qty: 0 | Refills: 0 | DISCHARGE

## 2022-12-13 RX ORDER — SPIRONOLACTONE 25 MG/1
25 TABLET, FILM COATED ORAL DAILY
Refills: 0 | Status: DISCONTINUED | OUTPATIENT
Start: 2022-12-13 | End: 2022-12-14

## 2022-12-13 RX ORDER — METOPROLOL TARTRATE 50 MG
1 TABLET ORAL
Qty: 90 | Refills: 0

## 2022-12-13 RX ORDER — FLUTICASONE FUROATE AND VILANTEROL TRIFENATATE 100; 25 UG/1; UG/1
1 POWDER RESPIRATORY (INHALATION)
Qty: 0 | Refills: 0 | DISCHARGE

## 2022-12-13 RX ORDER — METOPROLOL TARTRATE 50 MG
1 TABLET ORAL
Qty: 0 | Refills: 0 | DISCHARGE

## 2022-12-13 RX ORDER — ASCORBIC ACID 60 MG
1 TABLET,CHEWABLE ORAL
Qty: 0 | Refills: 0 | DISCHARGE

## 2022-12-13 RX ORDER — AMIODARONE HYDROCHLORIDE 400 MG/1
1 TABLET ORAL
Qty: 0 | Refills: 0 | DISCHARGE

## 2022-12-13 RX ADMIN — Medication 0.5 MILLIGRAM(S): at 17:29

## 2022-12-13 RX ADMIN — Medication 25 MILLIGRAM(S): at 17:29

## 2022-12-13 RX ADMIN — Medication 20 MILLIGRAM(S): at 13:47

## 2022-12-13 RX ADMIN — Medication 20 MILLIGRAM(S): at 06:00

## 2022-12-13 RX ADMIN — LOSARTAN POTASSIUM 25 MILLIGRAM(S): 100 TABLET, FILM COATED ORAL at 11:18

## 2022-12-13 RX ADMIN — Medication 0.5 MILLIGRAM(S): at 05:56

## 2022-12-13 RX ADMIN — CHLORHEXIDINE GLUCONATE 1 APPLICATION(S): 213 SOLUTION TOPICAL at 13:47

## 2022-12-13 RX ADMIN — APIXABAN 5 MILLIGRAM(S): 2.5 TABLET, FILM COATED ORAL at 17:29

## 2022-12-13 RX ADMIN — APIXABAN 5 MILLIGRAM(S): 2.5 TABLET, FILM COATED ORAL at 05:59

## 2022-12-13 RX ADMIN — Medication 75 MICROGRAM(S): at 05:59

## 2022-12-13 RX ADMIN — SPIRONOLACTONE 25 MILLIGRAM(S): 25 TABLET, FILM COATED ORAL at 11:25

## 2022-12-13 RX ADMIN — MONTELUKAST 10 MILLIGRAM(S): 4 TABLET, CHEWABLE ORAL at 22:19

## 2022-12-13 RX ADMIN — Medication 25 MILLIGRAM(S): at 05:58

## 2022-12-13 RX ADMIN — SIMVASTATIN 20 MILLIGRAM(S): 20 TABLET, FILM COATED ORAL at 22:20

## 2022-12-13 RX ADMIN — AMIODARONE HYDROCHLORIDE 200 MILLIGRAM(S): 400 TABLET ORAL at 05:57

## 2022-12-13 NOTE — DISCHARGE NOTE PROVIDER - HOSPITAL COURSE
71yo F w/ history of COPD (not on home O2), hypothyroidism, HTN, AF s/p ablation in Dec 2020, on eliquis, MV  Medtronic ICD placement coming in w/ worsening SOB x3 days associated w/ sharp left sided scapula pain radiating towards the left side. Was +for COVID 2 weeks prior to admission. Patient also  reports increase in weight over the past 3 days.    CHF Acute on Chronic with Pulmonary HTN   -  sec to MR/ s/p MV repair  - Continued on IV lasix for diuresis   - Echo noted with RV dysfunction   -CTA negative for pulmonary embolism, showing enlarged mPA   -BNP downtrended to  262 as of 12/13   -maintained on inhalers               73yo F w/ history of COPD (not on home O2), hypothyroidism, HTN, AF s/p ablation in Dec 2020, on eliquis, MV  Medtronic ICD placement coming in w/ worsening SOB x3 days associated w/ sharp left sided scapula pain radiating towards the left side. Was +for COVID 2 weeks prior to admission. Patient also  reports increase in weight over the past 3 days.    CHF Acute on Chronic with Pulmonary HTN   -  sec to MR/ s/p MV repair  - Received IV lasix and discontinued. Will send home on Bumex 1 mg PO QD  - Echo noted with RV dysfunction   -CTA negative for pulmonary embolism, showing enlarged mPA   -BNP downtrended to  262 as of 12/13   -maintained on inhalers  Lasix changed to bumex 1 mg PO QD  Discharge with outpatient follow-up with Dr. Espinoza

## 2022-12-13 NOTE — DISCHARGE NOTE PROVIDER - CARE PROVIDER_API CALL
Kasandra Espinoza  CARDIOVASCULAR DISEASE  [address], [address]  [place] [address]  Phone: [phone]  Fax: [phone]  Follow Up Time: 1 week

## 2022-12-13 NOTE — DISCHARGE NOTE PROVIDER - NSDCCPCAREPLAN_GEN_ALL_CORE_FT
PRINCIPAL DISCHARGE DIAGNOSIS  Diagnosis: SOB (shortness of breath)  Assessment and Plan of Treatment: You were admitted after having worsening shortness of breath. You CT angio shows:  "No pulmonary embolus. Enlarged main pulmonary artery, probable pulmonary arterial hypertension. No acute pulmonary or pleural abnormality". You have been recieving IV lasix for diuresis and have been continued on your inhalers   Take all medicines as told by your caregiver. If you have problems with a prescribed medicine, talk to your caregiver. Do not stop taking it on your own.   Do not smoke.  Eat a healthy diet. Avoid a high salt diet. Talk to a dietician () about foods you should eat.   Stay as active as possible. Avoid high altitudes.   Be careful when using a hot tub. A hot tub can lower your blood pressure.   SEEK IMMEDIATE MEDICAL CARE IF:  You have severe shortness of breath.  You develop chest pain or pressure.  You cough up blood.  You develop swelling of your feet or legs.  You have a sudden increase in weight         PRINCIPAL DISCHARGE DIAGNOSIS  Diagnosis: Acute on chronic systolic congestive heart failure  Assessment and Plan of Treatment: Weigh yourself daily.  If you gain 3lbs in 3 days, or 5lbs in a week call your Health Care Provider.  Do not eat or drink foods containing more than 2000mg of salt (sodium) in your diet every day.  Call your Health Care Provider if you have any swelling or increased swelling in your feet, ankles, and/or stomach.  Take all of your medication as directed.  If you become dizzy call your Health Care Provider.

## 2022-12-13 NOTE — PHARMACOTHERAPY INTERVENTION NOTE - COMMENTS
Confirmed home medications with patient, updated in Outpatient Medication Review.    Home Medications:  amiodarone 200 mg oral tablet: 1 tab(s) orally once a day  Artificial Tears ophthalmic solution: 1 drop(s) in each eye once a day, As Needed  Breo Ellipta 100 mcg-25 mcg/inh inhalation powder: 1 puff(s) inhaled once a day   Eliquis 5 mg oral tablet: 1 tab(s) orally 2 times a day   furosemide 20 mg oral tablet: 1 tab(s) orally once a day + additional 1 tab on Mon and Thurs  levothyroxine 75 mcg (0.075 mg) oral tablet: 1 tab(s) orally once a day  losartan 25 mg oral tablet: 1 tab(s) orally once a day  Metamucil 3.4 g/5.2 g oral powder for reconstitution: 1 packet(s) orally once a day, As Needed  Metoprolol Succinate ER 50 mg oral tablet, extended release: 1 tab(s) orally once a day   montelukast 10 mg oral tablet: 1 tab(s) orally once a day (at bedtime)   potassium chloride 10 mEq oral tablet, extended release: 1 tab(s) orally once a day + additional 1 tab on Mon and Thurs  Saline Mist 0.65% nasal spray (obsolete): 2 spray(s) nasal once a day, As Needed   simvastatin 20 mg oral tablet: 1 tab(s) orally once a day (at bedtime)  Tylenol 500 mg oral tablet: 2 tab(s) orally , As Needed  Vitamin C 500 mg oral tablet: 1 tab(s) orally once a day  Vitamin D3 2000 intl units (50 mcg) oral tablet: 1 tab(s) orally once a day    Added:  Amiodarone 200 mg     Changed:  Metoprolol tartrate 25 mg TID to metoprolol succinate ER 50 mg once a day    All medications appropriately reconciled.     Jose Valle, PharmD  PGY1 Pharmacy Resident  Available on [employer] [id number]

## 2022-12-13 NOTE — DISCHARGE NOTE PROVIDER - NSDCMRMEDTOKEN_GEN_ALL_CORE_FT
amiodarone 200 mg oral tablet: 1 tab(s) orally once a day  Artificial Tears ophthalmic solution: 1 drop(s) in each eye once a day, As Needed  Breo Ellipta 100 mcg-25 mcg/inh inhalation powder: 1 puff(s) inhaled once a day  Eliquis 5 mg oral tablet: 1 tab(s) orally 2 times a day  furosemide 20 mg oral tablet: 1 tab(s) orally once a day + additional 1 tab on Mon and Thurs  levothyroxine 75 mcg (0.075 mg) oral tablet: 1 tab(s) orally once a day  losartan 25 mg oral tablet: 1 tab(s) orally once a day  Metamucil 3.4 g/5.2 g oral powder for reconstitution: 1 packet(s) orally once a day, As Needed  Metoprolol Succinate ER 50 mg oral tablet, extended release: 1 tab(s) orally once a day  montelukast 10 mg oral tablet: 1 tab(s) orally once a day (at bedtime)  potassium chloride 10 mEq oral tablet, extended release: 1 tab(s) orally once a day + additional 1 tab on Mon and Thurs  Saline Mist 0.65% nasal spray (obsolete): 2 spray(s) nasal once a day, As Needed  simvastatin 20 mg oral tablet: 1 tab(s) orally once a day (at bedtime)  Tylenol 500 mg oral tablet: 2 tab(s) orally , As Needed  Vitamin C 500 mg oral tablet: 1 tab(s) orally once a day  Vitamin D3 2000 intl units (50 mcg) oral tablet: 1 tab(s) orally once a day   amiodarone 200 mg oral tablet: 1 tab(s) orally once a day  Artificial Tears ophthalmic solution: 1 drop(s) in each eye once a day, As Needed  Breo Ellipta 100 mcg-25 mcg/inh inhalation powder: 1 puff(s) inhaled once a day  bumetanide 1 mg oral tablet: 1 tab(s) orally once a day   Eliquis 5 mg oral tablet: 1 tab(s) orally 2 times a day  levothyroxine 75 mcg (0.075 mg) oral tablet: 1 tab(s) orally once a day  losartan 25 mg oral tablet: 1 tab(s) orally once a day  Metamucil 3.4 g/5.2 g oral powder for reconstitution: 1 packet(s) orally once a day, As Needed  Metoprolol Succinate ER 50 mg oral tablet, extended release: 1 tab(s) orally once a day  montelukast 10 mg oral tablet: 1 tab(s) orally once a day (at bedtime)  potassium chloride 10 mEq oral tablet, extended release: 1 tab(s) orally once a day + additional 1 tab on Mon and Thurs  Saline Mist 0.65% nasal spray (obsolete): 2 spray(s) nasal once a day, As Needed  simvastatin 20 mg oral tablet: 1 tab(s) orally once a day (at bedtime)  spironolactone 25 mg oral tablet: 1 tab(s) orally once a day  Tylenol 500 mg oral tablet: 2 tab(s) orally , As Needed  Vitamin C 500 mg oral tablet: 1 tab(s) orally once a day  Vitamin D3 2000 intl units (50 mcg) oral tablet: 1 tab(s) orally once a day

## 2022-12-13 NOTE — DISCHARGE NOTE PROVIDER - NSDCFUADDAPPT_GEN_ALL_CORE_FT
APPTS ARE READY TO BE MADE: [ X] YES    Best Family or Patient Contact (if needed):    Additional Information about above appointments (if needed):    1: Cardiology (Dr. Espinoza)  2:   3:     Other comments or requests:    APPTS ARE READY TO BE MADE: [ X] YES    Best Family or Patient Contact (if needed):    Additional Information about above appointments (if needed):    1: Cardiology (Dr. Espinoza)  2:   3:     Other comments or requests:       Patient was provided with Dr. Espinoza and was advised to call to schedule follow up within specified time frame. At this time patient declined scheduling assistance.

## 2022-12-14 ENCOUNTER — TRANSCRIPTION ENCOUNTER (OUTPATIENT)
Age: 72
End: 2022-12-14

## 2022-12-14 VITALS — RESPIRATION RATE: 18 BRPM | OXYGEN SATURATION: 91 %

## 2022-12-14 LAB
ANION GAP SERPL CALC-SCNC: 11 MMOL/L — SIGNIFICANT CHANGE UP (ref 5–17)
BUN SERPL-MCNC: 30 MG/DL — HIGH (ref 7–23)
CALCIUM SERPL-MCNC: 8.9 MG/DL — SIGNIFICANT CHANGE UP (ref 8.4–10.5)
CHLORIDE SERPL-SCNC: 99 MMOL/L — SIGNIFICANT CHANGE UP (ref 96–108)
CO2 SERPL-SCNC: 28 MMOL/L — SIGNIFICANT CHANGE UP (ref 22–31)
CREAT SERPL-MCNC: 0.82 MG/DL — SIGNIFICANT CHANGE UP (ref 0.5–1.3)
EGFR: 75 ML/MIN/1.73M2 — SIGNIFICANT CHANGE UP
GLUCOSE SERPL-MCNC: 81 MG/DL — SIGNIFICANT CHANGE UP (ref 70–99)
MAGNESIUM SERPL-MCNC: 2.2 MG/DL — SIGNIFICANT CHANGE UP (ref 1.6–2.6)
MRSA PCR RESULT.: SIGNIFICANT CHANGE UP
POTASSIUM SERPL-MCNC: 3.8 MMOL/L — SIGNIFICANT CHANGE UP (ref 3.5–5.3)
POTASSIUM SERPL-SCNC: 3.8 MMOL/L — SIGNIFICANT CHANGE UP (ref 3.5–5.3)
S AUREUS DNA NOSE QL NAA+PROBE: SIGNIFICANT CHANGE UP
SODIUM SERPL-SCNC: 138 MMOL/L — SIGNIFICANT CHANGE UP (ref 135–145)

## 2022-12-14 PROCEDURE — 84100 ASSAY OF PHOSPHORUS: CPT

## 2022-12-14 PROCEDURE — 85027 COMPLETE CBC AUTOMATED: CPT

## 2022-12-14 PROCEDURE — 87637 SARSCOV2&INF A&B&RSV AMP PRB: CPT

## 2022-12-14 PROCEDURE — 83735 ASSAY OF MAGNESIUM: CPT

## 2022-12-14 PROCEDURE — 85610 PROTHROMBIN TIME: CPT

## 2022-12-14 PROCEDURE — 85730 THROMBOPLASTIN TIME PARTIAL: CPT

## 2022-12-14 PROCEDURE — 36415 COLL VENOUS BLD VENIPUNCTURE: CPT

## 2022-12-14 PROCEDURE — 83880 ASSAY OF NATRIURETIC PEPTIDE: CPT

## 2022-12-14 PROCEDURE — 84484 ASSAY OF TROPONIN QUANT: CPT

## 2022-12-14 PROCEDURE — 99285 EMERGENCY DEPT VISIT HI MDM: CPT

## 2022-12-14 PROCEDURE — 80053 COMPREHEN METABOLIC PANEL: CPT

## 2022-12-14 PROCEDURE — 94640 AIRWAY INHALATION TREATMENT: CPT

## 2022-12-14 PROCEDURE — 71275 CT ANGIOGRAPHY CHEST: CPT | Mod: MA

## 2022-12-14 PROCEDURE — 80048 BASIC METABOLIC PNL TOTAL CA: CPT

## 2022-12-14 PROCEDURE — 85025 COMPLETE CBC W/AUTO DIFF WBC: CPT

## 2022-12-14 PROCEDURE — 82550 ASSAY OF CK (CPK): CPT

## 2022-12-14 PROCEDURE — 87640 STAPH A DNA AMP PROBE: CPT

## 2022-12-14 PROCEDURE — 82553 CREATINE MB FRACTION: CPT

## 2022-12-14 PROCEDURE — C8929: CPT

## 2022-12-14 PROCEDURE — 87641 MR-STAPH DNA AMP PROBE: CPT

## 2022-12-14 RX ORDER — SPIRONOLACTONE 25 MG/1
1 TABLET, FILM COATED ORAL
Qty: 30 | Refills: 0
Start: 2022-12-14 | End: 2023-01-12

## 2022-12-14 RX ORDER — BUMETANIDE 0.25 MG/ML
1 INJECTION INTRAMUSCULAR; INTRAVENOUS
Qty: 30 | Refills: 0
Start: 2022-12-14 | End: 2023-01-12

## 2022-12-14 RX ADMIN — APIXABAN 5 MILLIGRAM(S): 2.5 TABLET, FILM COATED ORAL at 05:55

## 2022-12-14 RX ADMIN — SPIRONOLACTONE 25 MILLIGRAM(S): 25 TABLET, FILM COATED ORAL at 05:55

## 2022-12-14 RX ADMIN — CHLORHEXIDINE GLUCONATE 1 APPLICATION(S): 213 SOLUTION TOPICAL at 05:56

## 2022-12-14 RX ADMIN — APIXABAN 5 MILLIGRAM(S): 2.5 TABLET, FILM COATED ORAL at 18:19

## 2022-12-14 RX ADMIN — Medication 0.5 MILLIGRAM(S): at 18:24

## 2022-12-14 RX ADMIN — Medication 25 MILLIGRAM(S): at 05:56

## 2022-12-14 RX ADMIN — AMIODARONE HYDROCHLORIDE 200 MILLIGRAM(S): 400 TABLET ORAL at 05:55

## 2022-12-14 RX ADMIN — Medication 75 MICROGRAM(S): at 05:56

## 2022-12-14 RX ADMIN — Medication 25 MILLIGRAM(S): at 18:19

## 2022-12-14 RX ADMIN — LOSARTAN POTASSIUM 25 MILLIGRAM(S): 100 TABLET, FILM COATED ORAL at 05:56

## 2022-12-14 RX ADMIN — Medication 0.5 MILLIGRAM(S): at 05:56

## 2022-12-14 RX ADMIN — Medication 20 MILLIGRAM(S): at 05:57

## 2022-12-14 NOTE — PROGRESS NOTE ADULT - ASSESSMENT
_________________________________________________________________________________________  ========>>  M E D I C A L   A T T E N D I N G    F O L L O W  U P  N O T E  <<=========  -----------------------------------------------------------------------------------------------------    - Patient seen and examined by me earlier today.   - In summary,  EDGAR HAMILTON is a 72y year old woman admitted with SOB  - Patient today overall doing ok, comfortable, eating OK.  overall otherwise doing better     ==================>> REVIEW OF SYSTEM <<=================    GEN: no fever, no chills, no pain  RESP: no SOB, no cough, no sputum  CVS: no chest pain, no palpitations, no edema  GI: no abdominal pain, no nausea, no constipation, no diarrhea  : no dysuria, no frequency, no hematuria  Neuro: no headache, no dizziness  Derm : no itching, no rash    ==================>> PHYSICAL EXAM <<=================    GEN: A&O X 3 , NAD , comfortable, pleasant, calm in chair, not on O2  HEENT: NCAT, PERRL, MMM, hearing intact  Neck: supple , no JVD appreciated  CVS: S1S2 , regular , No M/R/G appreciated  PULM: CTA B/L,  no W/R/R appreciated  ABD.: soft. non tender, non distended,  bowel sounds present  Extrem: intact pulses , no edema   PSYCH : normal mood,  not anxious                            ( Note Written / Date of service :  12-13-22 )    ==================>> MEDICATIONS <<====================    MEDICATIONS  (STANDING):  aMIOdarone    Tablet 200 milliGRAM(s) Oral daily  apixaban 5 milliGRAM(s) Oral every 12 hours  buDESOnide    Inhalation Suspension 0.5 milliGRAM(s) Inhalation two times a day  chlorhexidine 2% Cloths 1 Application(s) Topical <User Schedule>  furosemide   Injectable 20 milliGRAM(s) IV Push two times a day  levothyroxine 75 MICROGram(s) Oral daily  losartan 25 milliGRAM(s) Oral daily  metoprolol tartrate 25 milliGRAM(s) Oral two times a day  montelukast 10 milliGRAM(s) Oral at bedtime  simvastatin 20 milliGRAM(s) Oral at bedtime  spironolactone 25 milliGRAM(s) Oral daily    MEDICATIONS  (PRN):  acetaminophen     Tablet .. 650 milliGRAM(s) Oral every 6 hours PRN Temp greater or equal to 38C (100.4F), Moderate Pain (4 - 6)    ___________  Active diet:  Diet, Regular  ___________________    ==================>> VITAL SIGNS <<==================    T(C): 36.4 (12-13-22 @ 11:43), Max: 36.4 (12-13-22 @ 11:43)  HR: 70 (12-13-22 @ 11:43) (68 - 74)  BP: 113/75 (12-13-22 @ 11:43) (103/63 - 123/81)  RR: 18 (12-13-22 @ 11:43) (18 - 18)  SpO2: 91% (12-13-22 @ 11:43) (91% - 95%)     I&O's Summary    12 Dec 2022 07:01  -  13 Dec 2022 07:00  --------------------------------------------------------  IN: 360 mL / OUT: 0 mL / NET: 360 mL    13 Dec 2022 07:01  -  13 Dec 2022 15:51  --------------------------------------------------------  IN: 360 mL / OUT: 0 mL / NET: 360 mL     ==================>> LAB AND IMAGING <<==================                        13.7   9.47  )-----------( 246      ( 13 Dec 2022 08:39 )             42.8        12-13    137  |  97  |  23  ----------------------------<  123<H>  3.9   |  24  |  0.68    Ca    9.2      13 Dec 2022 09:57  Phos  3.4     12-12  Mg     2.2     12-13      < from: TTE with Doppler (w/Cont) (12.12.22 @ 17:59) >  Conclusions:  1. Annuloplasty ring in mitral position. Mild-moderate  mitral regurgitation. Peak mitral valve gradient equals 11  mm Hg, mean transmitral valve gradient equals 7 mm Hg,  consistent with moderate mitral stenosis.  2. Aortic valve measurements not provided.  3. Severely dilated left atrium.  LA volume index = 64 cc/m2.  4. Mild left ventricular enlargement.  5. Endocardial visualization enhanced with intravenous  injection of Ultrasonic Enhancing Agent (Definity).  Moderate global left ventricular dysfunction.  6. Increased E/e'  is consistent with elevated left  ventricular filling pressure.  7. A device wire is noted in the right heart.  8. Right ventricular enlargement with decreased right  ventricular systolic function.  9. Estimated pulmonary artery systolic pressure equals 48  mm Hg, assuming right atrial pressure equals 8 mm Hg,  consistent with mild pulmonary pressures.  *** Compared with echocardiogram of 9/11/2020, no  significant changes noted.  ------------------------------------------------------------------------  Confirmed on  12/12/2022 - 18:26:38 by Gerald Currie M.D.  < end of copied text >    ___________________________________________________________________________________  ===============>>  A S S E S S M E N T   A N D   P L A N <<===============  ------------------------------------------------------------------------------------------    acute on chronic systolic CHF  SOB / dyspnea  Mitral valve disease post repair  COPD  AF on AC  obesity  hypothyroidism  hypokalemia    ========>>>  appreciated cardio mgmt  overall improved post diuretics  no PE on CT  monitor BMP  supplement hypokalemia / hypomagnesemia as needed  keep K~4, Mag ~2  echo as above   continue full AC  Continue Current medications otherwise and monitor.  OOB / ambulate  supportive care  early discharge planing    -GI/DVT Prophylaxis per protocol.    --------------------------------------------  Case discussed with patient  Education given on findings and plan of care.  ___________________________  Will follow with you.  Thank you,  YVROSE Messina D.O.  Pager: [phone]
  _________________________________________________________________________________________  ========>>  M E D I C A L   A T T E N D I N G    F O L L O W  U P  N O T E  <<=========  -----------------------------------------------------------------------------------------------------    - Patient seen and examined by me earlier today.   - In summary,  EDGAR HAMILTON is a 72y year old woman admitted with SOB  - Patient today overall doing ok, comfortable, eating OK.  overall otherwise doing better     ==================>> REVIEW OF SYSTEM <<=================    GEN: no fever, no chills, no pain  RESP: no SOB, no cough, no sputum  CVS: no chest pain, no palpitations, no edema  GI: no abdominal pain, no nausea, no constipation, no diarrhea  : no dysuria, no frequency, no hematuria  Neuro: no headache, no dizziness  Derm : no itching, no rash    ==================>> PHYSICAL EXAM <<=================    GEN: A&O X 3 , NAD , comfortable, pleasant, calm in chair, not on O2  HEENT: NCAT, PERRL, MMM, hearing intact  Neck: supple , no JVD appreciated  CVS: S1S2 , regular , No M/R/G appreciated  PULM: CTA B/L,  no W/R/R appreciated  ABD.: soft. non tender, non distended,  bowel sounds present  Extrem: intact pulses , no edema   PSYCH : normal mood,  not anxious                                 ( Note written / Date of service 12-14-22 )    ==================>> MEDICATIONS <<====================    aMIOdarone    Tablet 200 milliGRAM(s) Oral daily  apixaban 5 milliGRAM(s) Oral every 12 hours  buDESOnide    Inhalation Suspension 0.5 milliGRAM(s) Inhalation two times a day  chlorhexidine 2% Cloths 1 Application(s) Topical <User Schedule>  levothyroxine 75 MICROGram(s) Oral daily  losartan 25 milliGRAM(s) Oral daily  metoprolol tartrate 25 milliGRAM(s) Oral two times a day  montelukast 10 milliGRAM(s) Oral at bedtime  simvastatin 20 milliGRAM(s) Oral at bedtime  spironolactone 25 milliGRAM(s) Oral daily    MEDICATIONS  (PRN):  acetaminophen     Tablet .. 650 milliGRAM(s) Oral every 6 hours PRN Temp greater or equal to 38C (100.4F), Moderate Pain (4 - 6)    ___________  Active diet:  Diet, Regular:   DASH/TLC Sodium & Cholesterol Restricted (DASH)  ___________________    ==================>> VITAL SIGNS <<==================    Vital Signs Last 24 HrsT(C): 36.7 (12-14-22 @ 11:30)  T(F): 98.1 (12-14-22 @ 11:30), Max: 98.1 (12-14-22 @ 11:30)  HR: 74 (12-14-22 @ 11:30) (69 - 75)  BP: 104/70 (12-14-22 @ 11:30)  RR: 18 (12-14-22 @ 11:49) (17 - 18)  SpO2: 91% (12-14-22 @ 11:49) (91% - 95%)       ==================>> LAB AND IMAGING <<==================                        13.7   9.47  )-----------( 246      ( 13 Dec 2022 08:39 )             42.8        12-14    138  |  99  |  30<H>  ----------------------------<  81  3.8   |  28  |  0.82    Ca    8.9      14 Dec 2022 06:58  Mg     2.2     12-14      WBC count:   9.47 <<== ,  9.71 <<== ,  9.84 <<==   Hemoglobin:   13.7 <<==,  13.8 <<==,  12.8 <<==  platelets:  246 <==, 267 <==, 232 <==    Creatinine:  0.82  <<==, 0.68  <<==, 0.73  <<==, 0.60  <<==  Sodium:   138  <==, 137  <==, 138  <==, 135  <==      < from: TTE with Doppler (w/Cont) (12.12.22 @ 17:59) >  Conclusions:  1. Annuloplasty ring in mitral position. Mild-moderate  mitral regurgitation. Peak mitral valve gradient equals 11  mm Hg, mean transmitral valve gradient equals 7 mm Hg,  consistent with moderate mitral stenosis.  2. Aortic valve measurements not provided.  3. Severely dilated left atrium.  LA volume index = 64 cc/m2.  4. Mild left ventricular enlargement.  5. Endocardial visualization enhanced with intravenous  injection of Ultrasonic Enhancing Agent (Definity).  Moderate global left ventricular dysfunction.  6. Increased E/e'  is consistent with elevated left  ventricular filling pressure.  7. A device wire is noted in the right heart.  8. Right ventricular enlargement with decreased right  ventricular systolic function.  9. Estimated pulmonary artery systolic pressure equals 48  mm Hg, assuming right atrial pressure equals 8 mm Hg,  consistent with mild pulmonary pressures.  *** Compared with echocardiogram of 9/11/2020, no  significant changes noted.  ------------------------------------------------------------------------  Confirmed on  12/12/2022 - 18:26:38 by Gerald Currie M.D.  < end of copied text >    ___________________________________________________________________________________  ===============>>  A S S E S S M E N T   A N D   P L A N <<===============  ------------------------------------------------------------------------------------------    acute on chronic systolic CHF  SOB / dyspnea  Mitral valve disease post repair  COPD  AF on AC  obesity  hypothyroidism  hypokalemia    ========>>>  appreciated cardio mgmt  overall improved post diuretics  no PE on CT  monitor BMP  supplement hypokalemia / hypomagnesemia as needed  keep K~4, Mag ~2  echo as above   continue full AC  Continue Current medications otherwise and monitor.  OOB / ambulate  supportive care  early discharge planing    -GI/DVT Prophylaxis per protocol.    --------------------------------------------  Case discussed with patient  Education given on findings and plan of care.  ___________________________  Will follow with you.  Thank you,  ROCIO Palaciosr: [phone]

## 2022-12-14 NOTE — DISCHARGE NOTE NURSING/CASE MANAGEMENT/SOCIAL WORK - NSDCPEFALRISK_GEN_ALL_CORE
For information on Fall & Injury Prevention, visit: [email or web address] OR  [email or web address] OR  https://www.cdc.gov/steadi/patient.html

## 2022-12-14 NOTE — PROGRESS NOTE ADULT - SUBJECTIVE AND OBJECTIVE BOX
CARDIOLOGY     PROGRESS  NOTE   ________________________________________________    CHIEF COMPLAINT:Patient is a 72y old  Female who presents with a chief complaint of sob (11 Dec 2022 17:45)  no complain, doing better  	  REVIEW OF SYSTEMS:  CONSTITUTIONAL: No fever, weight loss, or fatigue  EYES: No eye pain, visual disturbances, or discharge  ENT:  No difficulty hearing, tinnitus, vertigo; No sinus or throat pain  NECK: No pain or stiffness  RESPIRATORY: No cough, wheezing, chills or hemoptysis; +Shortness of Breath  CARDIOVASCULAR: No chest pain, palpitations, passing out, dizziness, or leg swelling  GASTROINTESTINAL: No abdominal or epigastric pain. No nausea, vomiting, or hematemesis; No diarrhea or constipation. No melena or hematochezia.  GENITOURINARY: No dysuria, frequency, hematuria, or incontinence  NEUROLOGICAL: No headaches, memory loss, loss of strength, numbness, or tremors  SKIN: No itching, burning, rashes, or lesions   LYMPH Nodes: No enlarged glands  ENDOCRINE: No heat or cold intolerance; No hair loss  MUSCULOSKELETAL: No joint pain or swelling; No muscle, back, or extremity pain  PSYCHIATRIC: No depression, anxiety, mood swings, or difficulty sleeping  HEME/LYMPH: No easy bruising, or bleeding gums  ALLERGY AND IMMUNOLOGIC: No hives or eczema	    [ ] All others negative	  [ ] Unable to obtain    PHYSICAL EXAM:  T(C): 36.4 (12-12-22 @ 05:54), Max: 36.5 (12-11-22 @ 19:16)  HR: 71 (12-12-22 @ 05:54) (71 - 89)  BP: 114/74 (12-12-22 @ 05:54) (110/74 - 162/88)  RR: 18 (12-12-22 @ 05:54) (18 - 22)  SpO2: 94% (12-12-22 @ 05:54) (92% - 96%)  Wt(kg): --  I&O's Summary      Appearance: Normal	  HEENT:   Normal oral mucosa, PERRL, EOMI	  Lymphatic: No lymphadenopathy  Cardiovascular: Normal S1 S2, No JVD, + murmurs, No edema  Respiratory:decrease bs  Psychiatry: A & O x 3, Mood & affect appropriate  Gastrointestinal:  Soft, Non-tender, + BS	  Skin: No rashes, No ecchymoses, No cyanosis	  Neurologic: Non-focal  Extremities: Normal range of motion, No clubbing, cyanosis or edema  Vascular: Peripheral pulses palpable 2+ bilaterally    MEDICATIONS  (STANDING):  aMIOdarone    Tablet 200 milliGRAM(s) Oral daily  apixaban 5 milliGRAM(s) Oral every 12 hours  buDESOnide    Inhalation Suspension 0.5 milliGRAM(s) Inhalation two times a day  furosemide   Injectable 20 milliGRAM(s) IV Push two times a day  levothyroxine 75 MICROGram(s) Oral daily  losartan 25 milliGRAM(s) Oral daily  metoprolol tartrate 25 milliGRAM(s) Oral two times a day  montelukast 10 milliGRAM(s) Oral at bedtime  potassium chloride    Tablet ER 20 milliEquivalent(s) Oral daily  simvastatin 20 milliGRAM(s) Oral at bedtime      TELEMETRY: 	    ECG:  	  RADIOLOGY:  OTHER: 	  	  LABS:	 	    CARDIAC MARKERS:  CARDIAC MARKERS ( 11 Dec 2022 21:04 )  x     / x     / 99 U/L / x     / 4.2 ng/mL                                13.8   9.71  )-----------( 267      ( 12 Dec 2022 06:43 )             41.6     12-12    138  |  97  |  16  ----------------------------<  95  3.5   |  28  |  0.73    Ca    9.3      12 Dec 2022 06:43    TPro  7.6  /  Alb  4.4  /  TBili  0.4  /  DBili  x   /  AST  14  /  ALT  9<L>  /  AlkPhos  60  12-11    proBNP: Serum Pro-Brain Natriuretic Peptide: 1364 pg/mL (12-11 @ 14:04)    Lipid Profile:   HgA1c:   TSH:   PT/INR - ( 11 Dec 2022 14:04 )   PT: 15.6 sec;   INR: 1.34 ratio         PTT - ( 11 Dec 2022 14:04 )  PTT:38.8 sec    < from: CT Angio Chest PE Protocol w/ IV Cont (12.11.22 @ 16:02) >  1. No pulmonary embolus. Enlarged main pulmonary artery, probable   pulmonary arterial hypertension.  2. No acute pulmonary or pleural abnormality.        Assessment and plan  ---------------------------  71yo F w/ 71 F history of COPD (not on home O2), hypothyroidism, HTN, AF s/p ablation in Dec 2020, on eliquis, MV  Medtronic ICD placement coming in w/ worsening SOB x3 days associated w/ sharp left sided scapula pain radiating towards the left side. Was +for COVID 2 weeks ago and tested negative today but called PCP Dr. Espinoza and was told to come to the ED. Patient reports increase in weight over the past 3 days.  chf acute on chronic with pulmonary htn sec to MR/ s/p MV repair  continue metoprolol and AC sec to hx of PAF  echo  copd/ asthma, continue inhalers  chest pain doubt cardiac check cardiac e  hx of covid recently, PE ruled out  continue Diuresis  awaiting echoi    	        
           CARDIOLOGY     PROGRESS  NOTE   ________________________________________________    CHIEF COMPLAINT:Patient is a 72y old  Female who presents with a chief complaint of sob (13 Dec 2022 16:09)  no complain  	  REVIEW OF SYSTEMS:  CONSTITUTIONAL: No fever, weight loss, or fatigue  EYES: No eye pain, visual disturbances, or discharge  ENT:  No difficulty hearing, tinnitus, vertigo; No sinus or throat pain  NECK: No pain or stiffness  RESPIRATORY: No cough, wheezing, chills or hemoptysis; decrease  Shortness of Breath  CARDIOVASCULAR: No chest pain, palpitations, passing out, dizziness, or leg swelling  GASTROINTESTINAL: No abdominal or epigastric pain. No nausea, vomiting, or hematemesis; No diarrhea or constipation. No melena or hematochezia.  GENITOURINARY: No dysuria, frequency, hematuria, or incontinence  NEUROLOGICAL: No headaches, memory loss, loss of strength, numbness, or tremors  SKIN: No itching, burning, rashes, or lesions   LYMPH Nodes: No enlarged glands  ENDOCRINE: No heat or cold intolerance; No hair loss  MUSCULOSKELETAL: No joint pain or swelling; No muscle, back, or extremity pain  PSYCHIATRIC: No depression, anxiety, mood swings, or difficulty sleeping  HEME/LYMPH: No easy bruising, or bleeding gums  ALLERGY AND IMMUNOLOGIC: No hives or eczema	    [ ] All others negative	  [ ] Unable to obtain    PHYSICAL EXAM:  T(C): 36.3 (12-14-22 @ 04:36), Max: 36.4 (12-13-22 @ 11:43)  HR: 70 (12-14-22 @ 05:50) (69 - 75)  BP: 114/71 (12-14-22 @ 05:50) (99/61 - 124/75)  RR: 18 (12-14-22 @ 04:36) (17 - 18)  SpO2: 95% (12-14-22 @ 04:36) (91% - 95%)  Wt(kg): --  I&O's Summary    13 Dec 2022 07:01  -  14 Dec 2022 07:00  --------------------------------------------------------  IN: 720 mL / OUT: 0 mL / NET: 720 mL        Appearance: Normal	  HEENT:   Normal oral mucosa, PERRL, EOMI	  Lymphatic: No lymphadenopathy  Cardiovascular: Normal S1 S2, No JVD, + murmurs, No edema  Respiratory: rhonchi  Psychiatry: A & O x 3, Mood & affect appropriate  Gastrointestinal:  Soft, Non-tender, + BS	  Skin: No rashes, No ecchymoses, No cyanosis	  Neurologic: Non-focal  Extremities: Normal range of motion, No clubbing, cyanosis or edema  Vascular: Peripheral pulses palpable 2+ bilaterally    MEDICATIONS  (STANDING):  aMIOdarone    Tablet 200 milliGRAM(s) Oral daily  apixaban 5 milliGRAM(s) Oral every 12 hours  buDESOnide    Inhalation Suspension 0.5 milliGRAM(s) Inhalation two times a day  chlorhexidine 2% Cloths 1 Application(s) Topical <User Schedule>  furosemide   Injectable 20 milliGRAM(s) IV Push two times a day  levothyroxine 75 MICROGram(s) Oral daily  losartan 25 milliGRAM(s) Oral daily  metoprolol tartrate 25 milliGRAM(s) Oral two times a day  montelukast 10 milliGRAM(s) Oral at bedtime  simvastatin 20 milliGRAM(s) Oral at bedtime  spironolactone 25 milliGRAM(s) Oral daily      TELEMETRY: 	    ECG:  	  RADIOLOGY:  OTHER: 	  	  LABS:	 	    CARDIAC MARKERS:                                13.7   9.47  )-----------( 246      ( 13 Dec 2022 08:39 )             42.8     12-13    137  |  97  |  23  ----------------------------<  123<H>  3.9   |  24  |  0.68    Ca    9.2      13 Dec 2022 09:57  Mg     2.2     12-13      proBNP: Serum Pro-Brain Natriuretic Peptide: 262 pg/mL (12-13 @ 09:57)  Serum Pro-Brain Natriuretic Peptide: 1364 pg/mL (12-11 @ 14:04)    Lipid Profile:   HgA1c:   TSH:         Assessment and plan  ---------------------------  71yo F w/ 71 F history of COPD (not on home O2), hypothyroidism, HTN, AF s/p ablation in Dec 2020, on eliquis, MV  Medtronic ICD placement coming in w/ worsening SOB x3 days associated w/ sharp left sided scapula pain radiating towards the left side. Was +for COVID 2 weeks ago and tested negative today but called PCP Dr. Espinoza and was told to come to the ED. Patient reports increase in weight over the past 3 days.  chf acute on chronic with pulmonary htn sec to MR/ s/p MV repair  continue metoprolol and AC sec to hx of PAF  echo  copd/ asthma, continue inhalers  chest pain doubt cardiac check cardiac e  hx of covid recently, PE ruled out  continue Diuresis  echo noted with rv dysfunction  continue lasix will add aldactone, fu martín  dc lasicx , change to bumex one mg daily    	        
           CARDIOLOGY     PROGRESS  NOTE   ________________________________________________    CHIEF COMPLAINT:Patient is a 72y old  Female who presents with a chief complaint of sob (12 Dec 2022 18:40)  doing better.  	  REVIEW OF SYSTEMS:  CONSTITUTIONAL: No fever, weight loss, or fatigue  EYES: No eye pain, visual disturbances, or discharge  ENT:  No difficulty hearing, tinnitus, vertigo; No sinus or throat pain  NECK: No pain or stiffness  RESPIRATORY: No cough, wheezing, chills or hemoptysis; decrease  Shortness of Breath  CARDIOVASCULAR: No chest pain, palpitations, passing out, dizziness, or leg swelling  GASTROINTESTINAL: No abdominal or epigastric pain. No nausea, vomiting, or hematemesis; No diarrhea or constipation. No melena or hematochezia.  GENITOURINARY: No dysuria, frequency, hematuria, or incontinence  NEUROLOGICAL: No headaches, memory loss, loss of strength, numbness, or tremors  SKIN: No itching, burning, rashes, or lesions   LYMPH Nodes: No enlarged glands  ENDOCRINE: No heat or cold intolerance; No hair loss  MUSCULOSKELETAL: No joint pain or swelling; No muscle, back, or extremity pain  PSYCHIATRIC: No depression, anxiety, mood swings, or difficulty sleeping  HEME/LYMPH: No easy bruising, or bleeding gums  ALLERGY AND IMMUNOLOGIC: No hives or eczema	    [ ] All others negative	  [ ] Unable to obtain    PHYSICAL EXAM:  T(C): 36.3 (12-13-22 @ 05:09), Max: 36.6 (12-12-22 @ 12:43)  HR: 74 (12-13-22 @ 05:50) (68 - 74)  BP: 107/70 (12-13-22 @ 05:50) (103/63 - 123/81)  RR: 18 (12-13-22 @ 05:09) (17 - 18)  SpO2: 95% (12-13-22 @ 05:09) (91% - 95%)  Wt(kg): --  I&O's Summary    12 Dec 2022 07:01  -  13 Dec 2022 07:00  --------------------------------------------------------  IN: 360 mL / OUT: 0 mL / NET: 360 mL        Appearance: Normal	  HEENT:   Normal oral mucosa, PERRL, EOMI	  Lymphatic: No lymphadenopathy  Cardiovascular: Normal S1 S2, No JVD, + murmurs, No edema  Respiratory: Lungs clear to auscultation	  Psychiatry: A & O x 3, Mood & affect appropriate  Gastrointestinal:  Soft, Non-tender, + BS	  Skin: No rashes, No ecchymoses, No cyanosis	  Neurologic: Non-focal  Extremities: Normal range of motion, No clubbing, cyanosis or edema  Vascular: Peripheral pulses palpable 2+ bilaterally    MEDICATIONS  (STANDING):  aMIOdarone    Tablet 200 milliGRAM(s) Oral daily  apixaban 5 milliGRAM(s) Oral every 12 hours  buDESOnide    Inhalation Suspension 0.5 milliGRAM(s) Inhalation two times a day  furosemide   Injectable 20 milliGRAM(s) IV Push two times a day  levothyroxine 75 MICROGram(s) Oral daily  losartan 25 milliGRAM(s) Oral daily  metoprolol tartrate 25 milliGRAM(s) Oral two times a day  montelukast 10 milliGRAM(s) Oral at bedtime  potassium chloride    Tablet ER 20 milliEquivalent(s) Oral daily  simvastatin 20 milliGRAM(s) Oral at bedtime      TELEMETRY: 	    ECG:  	  RADIOLOGY:  OTHER: 	  	  LABS:	 	    CARDIAC MARKERS:  CARDIAC MARKERS ( 11 Dec 2022 21:04 )  x     / x     / 99 U/L / x     / 4.2 ng/mL                                13.8   9.71  )-----------( 267      ( 12 Dec 2022 06:43 )             41.6     12-12    138  |  97  |  16  ----------------------------<  95  3.5   |  28  |  0.73    Ca    9.3      12 Dec 2022 06:43  Phos  3.4     12-12  Mg     2.1     12-12    TPro  7.6  /  Alb  4.4  /  TBili  0.4  /  DBili  x   /  AST  14  /  ALT  9<L>  /  AlkPhos  60  12-11    proBNP: Serum Pro-Brain Natriuretic Peptide: 1364 pg/mL (12-11 @ 14:04)    Lipid Profile:   HgA1c:   TSH:   PT/INR - ( 11 Dec 2022 14:04 )   PT: 15.6 sec;   INR: 1.34 ratio         PTT - ( 11 Dec 2022 14:04 )  PTT:38.8 sec    < from: TTE with Doppler (w/Cont) (12.12.22 @ 17:59) >  1. Annuloplasty ring in mitral position. Mild-moderate  mitral regurgitation. Peak mitral valve gradient equals 11  mm Hg, mean transmitral valve gradient equals 7 mm Hg,  consistent with moderate mitral stenosis.  2. Aortic valve measurements not provided.  3. Severely dilated left atrium.  LA volume index = 64  cc/m2.  4. Mild left ventricular enlargement.  5. Endocardial visualization enhanced with intravenous  injection of Ultrasonic Enhancing Agent (Definity).  Moderate global left ventricular dysfunction.  6. Increased E/e'  is consistent with elevated left  ventricular filling pressure.  7. A device wire is noted in the right heart.  8. Right ventricular enlargement with decreased right  ventricular systolic function.  9. Estimated pulmonary artery systolic pressure equals 48  mm Hg, assuming right atrial pressure equals 8 mm Hg,  consistent with mild pulmonary pressures.      Assessment and plan  ---------------------------  73yo F w/ 71 F history of COPD (not on home O2), hypothyroidism, HTN, AF s/p ablation in Dec 2020, on eliquis, MV  Medtronic ICD placement coming in w/ worsening SOB x3 days associated w/ sharp left sided scapula pain radiating towards the left side. Was +for COVID 2 weeks ago and tested negative today but called PCP Dr. Espinoza and was told to come to the ED. Patient reports increase in weight over the past 3 days.  chf acute on chronic with pulmonary htn sec to MR/ s/p MV repair  continue metoprolol and AC sec to hx of PAF  echo  copd/ asthma, continue inhalers  chest pain doubt cardiac check cardiac e  hx of covid recently, PE ruled out  continue Diuresis  echo noted with rv dysfunction  continue lasix will add aldactone, fu lytes

## 2022-12-14 NOTE — DISCHARGE NOTE NURSING/CASE MANAGEMENT/SOCIAL WORK - NSDCFUADDAPPT_GEN_ALL_CORE_FT
APPTS ARE READY TO BE MADE: [ X] YES    Best Family or Patient Contact (if needed):    Additional Information about above appointments (if needed):    1: Cardiology (Dr. Espinoza)  2:   3:     Other comments or requests:       Patient was provided with Dr. Espinoza and was advised to call to schedule follow up within specified time frame. At this time patient declined scheduling assistance.

## 2022-12-14 NOTE — CHART NOTE - NSCHARTNOTEFT_GEN_A_CORE
Discharge Note:    Requested by Dr. Espinoza to facilitate d/c home.  V/s, labs, diagnostics reviewed, pt stable to d/c now.  Medication reconciliation reviewed, revised, and resolved with Dr. Espinoza who medically cleared w/ f/u as directed.   Please refer to d/c note for hospital details.    Ary Hope, SELENA-c  [id number]
RD CHF education chart note    Patient was visited by RD for CHF education. Heart failure education provided to the patient in detail. Discussed heart failure nutrition therapy, sodium and fluid intake, importance of diet adherence, daily weights monitoring with the patient. Reinforced importance of weight gain parameters and importance of contacting MD’s about weight changes. Provided handouts on heart failure nutrition therapy, reading heart healthy nutrition labels, heart healthy shopping tips and low sodium food list. Patient verbalized understanding demonstrated by teach back method. RD contact information left with patient for any future questioning.    Eva Alba RD CDN #[phone]

## 2022-12-14 NOTE — DISCHARGE NOTE NURSING/CASE MANAGEMENT/SOCIAL WORK - PATIENT PORTAL LINK FT
You can access the FollowMyHealth Patient Portal offered by [facility] by registering at the following website: [email or web address]. By joining [facility]’s FollowMyHealth portal, you will also be able to view your health information using other applications (apps) compatible with our system.

## 2022-12-19 NOTE — PATIENT PROFILE ADULT - DEAF OR HARD OF HEARING?
Caller: Leyla Oliva    Relationship: Self    Requested Prescriptions:   Requested Prescriptions     Pending Prescriptions Disp Refills   • warfarin (COUMADIN) 10 MG tablet 30 tablet 11     Sig: Take 1 tablet by mouth Daily. as directed   • warfarin (COUMADIN) 2 MG tablet 90 tablet 3     Sig: Take 3 tablets by mouth Daily.        Pharmacy where request should be sent: [facility]/PHARMACY #[id number] - [address] [phone] [facility] [phone] no

## 2023-10-15 NOTE — PATIENT PROFILE ADULT - NSPROPTRIGHTNOTIFY_GEN_A_NUR
Problem: Cardiac Surgery  Goal: Hemodynamic stability achieved/maintained  Description: Hemodynamic instability may include VS or rhythm changes or s/s FVE.  AHA guidelines: Keep BP>90 mm Hg.  Outcome: Outcome Not Met, Continue to Monitor  Goal: Neurological status returned to baseline  Outcome: Outcome Not Met, Continue to Monitor      declines

## 2024-08-29 NOTE — ED ADULT NURSE NOTE - PAIN RATING/NUMBER SCALE (0-10): REST
0 Detail Level: Detailed Render Note In Bullet Format When Appropriate: No Post-Care Instructions: I reviewed with the patient in detail post-care instructions. Patient is to wear sunprotection, and avoid picking at any of the treated lesions. Pt may apply Vaseline to crusted or scabbing areas. Duration Of Freeze Thaw-Cycle (Seconds): 0 Show Applicator Variable?: Yes Consent: The patient's consent was obtained including but not limited to risks of crusting, scabbing, blistering, scarring, darker or lighter pigmentary change, recurrence, incomplete removal and infection.

## 2025-08-06 ENCOUNTER — NON-APPOINTMENT (OUTPATIENT)
Age: 75
End: 2025-08-06

## 2025-08-11 ENCOUNTER — NON-APPOINTMENT (OUTPATIENT)
Age: 75
End: 2025-08-11

## 2025-08-13 ENCOUNTER — APPOINTMENT (OUTPATIENT)
Dept: ORTHOPEDIC SURGERY | Facility: CLINIC | Age: 75
End: 2025-08-13
Payer: MEDICARE

## 2025-08-13 DIAGNOSIS — S43.005A UNSPECIFIED DISLOCATION OF LEFT SHOULDER JOINT, INITIAL ENCOUNTER: ICD-10-CM

## 2025-08-13 PROCEDURE — 99203 OFFICE O/P NEW LOW 30 MIN: CPT

## 2025-08-13 PROCEDURE — 73030 X-RAY EXAM OF SHOULDER: CPT | Mod: LT

## 2025-08-27 ENCOUNTER — TRANSCRIPTION ENCOUNTER (OUTPATIENT)
Age: 75
End: 2025-08-27